# Patient Record
Sex: FEMALE | Race: ASIAN | NOT HISPANIC OR LATINO | URBAN - METROPOLITAN AREA
[De-identification: names, ages, dates, MRNs, and addresses within clinical notes are randomized per-mention and may not be internally consistent; named-entity substitution may affect disease eponyms.]

---

## 2017-01-11 ENCOUNTER — INPATIENT (INPATIENT)
Facility: HOSPITAL | Age: 82
LOS: 2 days | Discharge: HOME | End: 2017-01-14
Attending: INTERNAL MEDICINE | Admitting: INTERNAL MEDICINE
Payer: COMMERCIAL

## 2017-01-11 VITALS
HEART RATE: 95 BPM | OXYGEN SATURATION: 99 % | DIASTOLIC BLOOD PRESSURE: 84 MMHG | TEMPERATURE: 98 F | RESPIRATION RATE: 16 BRPM | SYSTOLIC BLOOD PRESSURE: 159 MMHG

## 2017-01-11 LAB
ALBUMIN SERPL ELPH-MCNC: 4.1 G/DL — SIGNIFICANT CHANGE UP (ref 3.3–5)
ALP SERPL-CCNC: 45 U/L — SIGNIFICANT CHANGE UP (ref 40–120)
ALT FLD-CCNC: 16 U/L — SIGNIFICANT CHANGE UP (ref 4–33)
APPEARANCE UR: CLEAR — SIGNIFICANT CHANGE UP
AST SERPL-CCNC: 24 U/L — SIGNIFICANT CHANGE UP (ref 4–32)
BACTERIA # UR AUTO: SIGNIFICANT CHANGE UP
BASE EXCESS BLDV CALC-SCNC: -1.4 MMOL/L — SIGNIFICANT CHANGE UP
BASE EXCESS BLDV CALC-SCNC: -2.3 MMOL/L — SIGNIFICANT CHANGE UP
BASOPHILS # BLD AUTO: 0.01 K/UL — SIGNIFICANT CHANGE UP (ref 0–0.2)
BASOPHILS NFR BLD AUTO: 0.2 % — SIGNIFICANT CHANGE UP (ref 0–2)
BILIRUB SERPL-MCNC: 0.4 MG/DL — SIGNIFICANT CHANGE UP (ref 0.2–1.2)
BILIRUB UR-MCNC: NEGATIVE — SIGNIFICANT CHANGE UP
BLOOD GAS VENOUS - CREATININE: 0.62 MG/DL — SIGNIFICANT CHANGE UP (ref 0.5–1.3)
BLOOD GAS VENOUS - CREATININE: 0.62 MG/DL — SIGNIFICANT CHANGE UP (ref 0.5–1.3)
BLOOD UR QL VISUAL: NEGATIVE — SIGNIFICANT CHANGE UP
BUN SERPL-MCNC: 11 MG/DL — SIGNIFICANT CHANGE UP (ref 7–23)
CALCIUM SERPL-MCNC: 8.7 MG/DL — SIGNIFICANT CHANGE UP (ref 8.4–10.5)
CHLORIDE BLDV-SCNC: 102 MMOL/L — SIGNIFICANT CHANGE UP (ref 96–108)
CHLORIDE BLDV-SCNC: 103 MMOL/L — SIGNIFICANT CHANGE UP (ref 96–108)
CHLORIDE SERPL-SCNC: 97 MMOL/L — LOW (ref 98–107)
CO2 SERPL-SCNC: 19 MMOL/L — LOW (ref 22–31)
COLOR SPEC: SIGNIFICANT CHANGE UP
CREAT SERPL-MCNC: 0.72 MG/DL — SIGNIFICANT CHANGE UP (ref 0.5–1.3)
EOSINOPHIL # BLD AUTO: 0.07 K/UL — SIGNIFICANT CHANGE UP (ref 0–0.5)
EOSINOPHIL NFR BLD AUTO: 1.1 % — SIGNIFICANT CHANGE UP (ref 0–6)
GAS PNL BLDV: 130 MMOL/L — LOW (ref 136–146)
GAS PNL BLDV: 136 MMOL/L — SIGNIFICANT CHANGE UP (ref 136–146)
GLUCOSE BLDV-MCNC: 168 — HIGH (ref 70–99)
GLUCOSE BLDV-MCNC: 173 — HIGH (ref 70–99)
GLUCOSE SERPL-MCNC: 178 MG/DL — HIGH (ref 70–99)
GLUCOSE UR-MCNC: NEGATIVE — SIGNIFICANT CHANGE UP
HCO3 BLDV-SCNC: 21 MMOL/L — SIGNIFICANT CHANGE UP (ref 20–27)
HCO3 BLDV-SCNC: 22 MMOL/L — SIGNIFICANT CHANGE UP (ref 20–27)
HCT VFR BLD CALC: 35.9 % — SIGNIFICANT CHANGE UP (ref 34.5–45)
HCT VFR BLDV CALC: 37.6 % — SIGNIFICANT CHANGE UP (ref 34.5–45)
HCT VFR BLDV CALC: 43.1 % — SIGNIFICANT CHANGE UP (ref 34.5–45)
HGB BLD-MCNC: 11.9 G/DL — SIGNIFICANT CHANGE UP (ref 11.5–15.5)
HGB BLDV-MCNC: 12.2 G/DL — SIGNIFICANT CHANGE UP (ref 11.5–15.5)
HGB BLDV-MCNC: 14.1 G/DL — SIGNIFICANT CHANGE UP (ref 11.5–15.5)
IMM GRANULOCYTES NFR BLD AUTO: 0.2 % — SIGNIFICANT CHANGE UP (ref 0–1.5)
KETONES UR-MCNC: NEGATIVE — SIGNIFICANT CHANGE UP
LACTATE BLDV-MCNC: 3.4 MMOL/L — HIGH (ref 0.5–2)
LACTATE BLDV-MCNC: 3.9 MMOL/L — HIGH (ref 0.5–2)
LEUKOCYTE ESTERASE UR-ACNC: NEGATIVE — SIGNIFICANT CHANGE UP
LIDOCAIN IGE QN: 37.7 U/L — SIGNIFICANT CHANGE UP (ref 7–60)
LYMPHOCYTES # BLD AUTO: 2.56 K/UL — SIGNIFICANT CHANGE UP (ref 1–3.3)
LYMPHOCYTES # BLD AUTO: 40.8 % — SIGNIFICANT CHANGE UP (ref 13–44)
MCHC RBC-ENTMCNC: 29.9 PG — SIGNIFICANT CHANGE UP (ref 27–34)
MCHC RBC-ENTMCNC: 33.1 % — SIGNIFICANT CHANGE UP (ref 32–36)
MCV RBC AUTO: 90.2 FL — SIGNIFICANT CHANGE UP (ref 80–100)
MONOCYTES # BLD AUTO: 0.43 K/UL — SIGNIFICANT CHANGE UP (ref 0–0.9)
MONOCYTES NFR BLD AUTO: 6.9 % — SIGNIFICANT CHANGE UP (ref 2–14)
MUCOUS THREADS # UR AUTO: SIGNIFICANT CHANGE UP
NEUTROPHILS # BLD AUTO: 3.19 K/UL — SIGNIFICANT CHANGE UP (ref 1.8–7.4)
NEUTROPHILS NFR BLD AUTO: 50.8 % — SIGNIFICANT CHANGE UP (ref 43–77)
NITRITE UR-MCNC: NEGATIVE — SIGNIFICANT CHANGE UP
NON-SQ EPI CELLS # UR AUTO: <1 — SIGNIFICANT CHANGE UP
PCO2 BLDV: 39 MMHG — LOW (ref 41–51)
PCO2 BLDV: 55 MMHG — HIGH (ref 41–51)
PH BLDV: 7.28 PH — LOW (ref 7.32–7.43)
PH BLDV: 7.37 PH — SIGNIFICANT CHANGE UP (ref 7.32–7.43)
PH UR: 6.5 — SIGNIFICANT CHANGE UP (ref 4.6–8)
PLATELET # BLD AUTO: 199 K/UL — SIGNIFICANT CHANGE UP (ref 150–400)
PMV BLD: 10.3 FL — SIGNIFICANT CHANGE UP (ref 7–13)
PO2 BLDV: 24 MMHG — LOW (ref 35–40)
PO2 BLDV: 50 MMHG — HIGH (ref 35–40)
POTASSIUM BLDV-SCNC: 3.9 MMOL/L — SIGNIFICANT CHANGE UP (ref 3.4–4.5)
POTASSIUM BLDV-SCNC: 3.9 MMOL/L — SIGNIFICANT CHANGE UP (ref 3.4–4.5)
POTASSIUM SERPL-MCNC: 4.2 MMOL/L — SIGNIFICANT CHANGE UP (ref 3.5–5.3)
POTASSIUM SERPL-SCNC: 4.2 MMOL/L — SIGNIFICANT CHANGE UP (ref 3.5–5.3)
PROT SERPL-MCNC: 7.5 G/DL — SIGNIFICANT CHANGE UP (ref 6–8.3)
PROT UR-MCNC: NEGATIVE — SIGNIFICANT CHANGE UP
RBC # BLD: 3.98 M/UL — SIGNIFICANT CHANGE UP (ref 3.8–5.2)
RBC # FLD: 12.9 % — SIGNIFICANT CHANGE UP (ref 10.3–14.5)
SAO2 % BLDV: 29.6 % — LOW (ref 60–85)
SAO2 % BLDV: 83.5 % — SIGNIFICANT CHANGE UP (ref 60–85)
SODIUM SERPL-SCNC: 134 MMOL/L — LOW (ref 135–145)
SP GR SPEC: 1.01 — SIGNIFICANT CHANGE UP (ref 1–1.03)
SQUAMOUS # UR AUTO: SIGNIFICANT CHANGE UP
UROBILINOGEN FLD QL: NORMAL E.U. — SIGNIFICANT CHANGE UP (ref 0.1–0.2)
WBC # BLD: 6.27 K/UL — SIGNIFICANT CHANGE UP (ref 3.8–10.5)
WBC # FLD AUTO: 6.27 K/UL — SIGNIFICANT CHANGE UP (ref 3.8–10.5)
WBC UR QL: SIGNIFICANT CHANGE UP (ref 0–?)

## 2017-01-11 PROCEDURE — 74174 CTA ABD&PLVS W/CONTRAST: CPT | Mod: 26

## 2017-01-11 RX ORDER — SODIUM CHLORIDE 9 MG/ML
1000 INJECTION INTRAMUSCULAR; INTRAVENOUS; SUBCUTANEOUS ONCE
Qty: 0 | Refills: 0 | Status: COMPLETED | OUTPATIENT
Start: 2017-01-11 | End: 2017-01-11

## 2017-01-11 RX ORDER — GLYBURIDE 5 MG
10 TABLET ORAL
Qty: 0 | Refills: 0 | Status: DISCONTINUED | OUTPATIENT
Start: 2017-01-11 | End: 2017-01-11

## 2017-01-11 RX ORDER — FOLIC ACID 0.8 MG
1 TABLET ORAL DAILY
Qty: 0 | Refills: 0 | Status: DISCONTINUED | OUTPATIENT
Start: 2017-01-11 | End: 2017-01-14

## 2017-01-11 RX ORDER — METRONIDAZOLE 500 MG
500 TABLET ORAL EVERY 8 HOURS
Qty: 0 | Refills: 0 | Status: DISCONTINUED | OUTPATIENT
Start: 2017-01-11 | End: 2017-01-14

## 2017-01-11 RX ORDER — ACETAMINOPHEN 500 MG
650 TABLET ORAL ONCE
Qty: 0 | Refills: 0 | Status: COMPLETED | OUTPATIENT
Start: 2017-01-11 | End: 2017-01-11

## 2017-01-11 RX ORDER — METFORMIN HYDROCHLORIDE 850 MG/1
1000 TABLET ORAL
Qty: 0 | Refills: 0 | Status: DISCONTINUED | OUTPATIENT
Start: 2017-01-11 | End: 2017-01-12

## 2017-01-11 RX ORDER — INSULIN LISPRO 100/ML
VIAL (ML) SUBCUTANEOUS
Qty: 0 | Refills: 0 | Status: DISCONTINUED | OUTPATIENT
Start: 2017-01-11 | End: 2017-01-14

## 2017-01-11 RX ORDER — DEXTROSE 50 % IN WATER 50 %
25 SYRINGE (ML) INTRAVENOUS ONCE
Qty: 0 | Refills: 0 | Status: DISCONTINUED | OUTPATIENT
Start: 2017-01-11 | End: 2017-01-14

## 2017-01-11 RX ORDER — MORPHINE SULFATE 50 MG/1
4 CAPSULE, EXTENDED RELEASE ORAL ONCE
Qty: 0 | Refills: 0 | Status: DISCONTINUED | OUTPATIENT
Start: 2017-01-11 | End: 2017-01-11

## 2017-01-11 RX ORDER — GLUCAGON INJECTION, SOLUTION 0.5 MG/.1ML
1 INJECTION, SOLUTION SUBCUTANEOUS ONCE
Qty: 0 | Refills: 0 | Status: DISCONTINUED | OUTPATIENT
Start: 2017-01-11 | End: 2017-01-14

## 2017-01-11 RX ORDER — SODIUM CHLORIDE 9 MG/ML
1000 INJECTION, SOLUTION INTRAVENOUS
Qty: 0 | Refills: 0 | Status: DISCONTINUED | OUTPATIENT
Start: 2017-01-11 | End: 2017-01-14

## 2017-01-11 RX ORDER — CIPROFLOXACIN LACTATE 400MG/40ML
400 VIAL (ML) INTRAVENOUS EVERY 12 HOURS
Qty: 0 | Refills: 0 | Status: DISCONTINUED | OUTPATIENT
Start: 2017-01-11 | End: 2017-01-14

## 2017-01-11 RX ORDER — VALSARTAN 80 MG/1
40 TABLET ORAL ONCE
Qty: 0 | Refills: 0 | Status: COMPLETED | OUTPATIENT
Start: 2017-01-11 | End: 2017-01-11

## 2017-01-11 RX ORDER — DEXTROSE 50 % IN WATER 50 %
12.5 SYRINGE (ML) INTRAVENOUS ONCE
Qty: 0 | Refills: 0 | Status: DISCONTINUED | OUTPATIENT
Start: 2017-01-11 | End: 2017-01-14

## 2017-01-11 RX ORDER — DEXTROSE 50 % IN WATER 50 %
1 SYRINGE (ML) INTRAVENOUS ONCE
Qty: 0 | Refills: 0 | Status: DISCONTINUED | OUTPATIENT
Start: 2017-01-11 | End: 2017-01-14

## 2017-01-11 RX ORDER — VALSARTAN 80 MG/1
40 TABLET ORAL DAILY
Qty: 0 | Refills: 0 | Status: DISCONTINUED | OUTPATIENT
Start: 2017-01-11 | End: 2017-01-12

## 2017-01-11 RX ORDER — INSULIN GLARGINE 100 [IU]/ML
10 INJECTION, SOLUTION SUBCUTANEOUS AT BEDTIME
Qty: 0 | Refills: 0 | Status: DISCONTINUED | OUTPATIENT
Start: 2017-01-11 | End: 2017-01-12

## 2017-01-11 RX ORDER — CIPROFLOXACIN LACTATE 400MG/40ML
400 VIAL (ML) INTRAVENOUS ONCE
Qty: 0 | Refills: 0 | Status: COMPLETED | OUTPATIENT
Start: 2017-01-11 | End: 2017-01-11

## 2017-01-11 RX ORDER — METRONIDAZOLE 500 MG
500 TABLET ORAL ONCE
Qty: 0 | Refills: 0 | Status: COMPLETED | OUTPATIENT
Start: 2017-01-11 | End: 2017-01-11

## 2017-01-11 RX ORDER — SODIUM CHLORIDE 9 MG/ML
1000 INJECTION INTRAMUSCULAR; INTRAVENOUS; SUBCUTANEOUS
Qty: 0 | Refills: 0 | Status: DISCONTINUED | OUTPATIENT
Start: 2017-01-11 | End: 2017-01-12

## 2017-01-11 RX ADMIN — METFORMIN HYDROCHLORIDE 1000 MILLIGRAM(S): 850 TABLET ORAL at 18:03

## 2017-01-11 RX ADMIN — Medication 100 MILLIGRAM(S): at 17:16

## 2017-01-11 RX ADMIN — SODIUM CHLORIDE 125 MILLILITER(S): 9 INJECTION INTRAMUSCULAR; INTRAVENOUS; SUBCUTANEOUS at 21:56

## 2017-01-11 RX ADMIN — SODIUM CHLORIDE 125 MILLILITER(S): 9 INJECTION INTRAMUSCULAR; INTRAVENOUS; SUBCUTANEOUS at 14:49

## 2017-01-11 RX ADMIN — MORPHINE SULFATE 4 MILLIGRAM(S): 50 CAPSULE, EXTENDED RELEASE ORAL at 11:02

## 2017-01-11 RX ADMIN — VALSARTAN 40 MILLIGRAM(S): 80 TABLET ORAL at 23:00

## 2017-01-11 RX ADMIN — SODIUM CHLORIDE 1000 MILLILITER(S): 9 INJECTION INTRAMUSCULAR; INTRAVENOUS; SUBCUTANEOUS at 12:56

## 2017-01-11 RX ADMIN — Medication 650 MILLIGRAM(S): at 23:45

## 2017-01-11 RX ADMIN — MORPHINE SULFATE 4 MILLIGRAM(S): 50 CAPSULE, EXTENDED RELEASE ORAL at 11:20

## 2017-01-11 RX ADMIN — SODIUM CHLORIDE 125 MILLILITER(S): 9 INJECTION INTRAMUSCULAR; INTRAVENOUS; SUBCUTANEOUS at 18:52

## 2017-01-11 RX ADMIN — Medication 650 MILLIGRAM(S): at 23:00

## 2017-01-11 RX ADMIN — SODIUM CHLORIDE 1000 MILLILITER(S): 9 INJECTION INTRAMUSCULAR; INTRAVENOUS; SUBCUTANEOUS at 11:02

## 2017-01-11 RX ADMIN — INSULIN GLARGINE 10 UNIT(S): 100 INJECTION, SOLUTION SUBCUTANEOUS at 21:54

## 2017-01-11 RX ADMIN — Medication 200 MILLIGRAM(S): at 13:13

## 2017-01-11 NOTE — ED PROVIDER NOTE - OBJECTIVE STATEMENT
Pt is an 86 y/o F nonsmoker PMHx DM, HTN, HLD, s/p tam, s/p umbilical hernia repair p/w abdominal pain and diarrhea x 7 days.  Pt endorses insidious onset watery nonbloody diarrhea associated with generalized abdominal pain, worse across lower abdomen.  Denies any fevers, chills, nausea, vomiting, chest pain, SOB, back pain, dysuria, cloudy urine, hematuria, flank pain, recent antibiotic use, travelling, or hospitalizations.

## 2017-01-11 NOTE — ED PROVIDER NOTE - ATTENDING CONTRIBUTION TO CARE
Dr. Hatch:  I performed a face to face bedside interview with patient regarding history of present illness, review of symptoms and past medical history. I completed an independent physical exam.  I have discussed patient's plan of care with PA.   I agree with note as stated above, having amended the EMR as needed to reflect my findings.   This includes HISTORY OF PRESENT ILLNESS, HIV, PAST MEDICAL/SURGICAL/FAMILY/SOCIAL HISTORY, ALLERGIES AND HOME MEDICATIONS, REVIEW OF SYSTEMS, PHYSICAL EXAM, and any PROGRESS NOTES during the time I functioned as the attending physician for this patient.    85F h/o HTN, DM, s/p cholecystectomy, presents with generalized abd pain and watery diarrhea x 1 week.  No recent antibiotics, travel, sick contacts.  Denies fever/chills, cp, sob, n/v, urinary symptoms.    Exam:  - well appearing  - dry mucus membrane  - rrr  - ctab  - abd soft, +mild TTP RLQ and LLQ    A/P  - ddx: colitis, diverticulitis, perforated appendicitis; consider mesenteric ischemia and C diff  - cbc, cmp, lipase, vbg  - abd CT angio  - IVF, pain control

## 2017-01-11 NOTE — ED CDU PROVIDER NOTE - ATTENDING CONTRIBUTION TO CARE
I have seen and examined the patient face to face, have reviewed and addended the HPI, PE and a/p as necessary. ( see hpi pe and mdm " att" ) h/o HTN DM HLD lives alone, here with c/o loose watery stools for several days. pt denies recent travel. no recent abx use. no known sick contacts.  pt still feeling weak overnight in CDU, not toleratin PO well, receiving  cipro/ flagyl for colitis. last bm loose watery nonbloody at 4 am.   att exam: patient awake alert NAD . LUNGS CTAB no wheeze no crackle. CARD RRR no m/r/g.  Abdomen soft, nontender.  no rebound no guarding no CVA tenderness. EXT WWP no edema no calf tenderness CV 2+DP/PT bilaterally. nuero A&Ox3   skin warm and dry no rash   plan: due to fatigue, dehuydration, and pt lives alone. will admit for further pt, dehydration, and colitis.   Cirilli

## 2017-01-11 NOTE — ED ADULT NURSE NOTE - OBJECTIVE STATEMENT
Patient received AA&Ox3 c/o abdominal pain and diarrhea x1wk. VSS on RA. Patient denies chest pain, N/V, SOB, fever, chills. Patient states that she has had 5-6 BMs per day for the past week - pt. states that this occurs after she eats. 20g PIV in place to left FA, labs drawn - will continue to monitor.

## 2017-01-11 NOTE — ED CDU PROVIDER NOTE - ENMT NEGATIVE STATEMENT, MLM
Ears: no ear pain and no hearing problems. Nose: no nasal congestion and no nasal drainage. Mouth/Throat: no dysphagia, no hoarseness and no throat pain.Neck: no lumps, no pain, no stiffness and no swollen glands.

## 2017-01-11 NOTE — ED CDU PROVIDER NOTE - OBJECTIVE STATEMENT
Patient is a 85 year old female who states that  she has had diarrhea for the past 7 days. States that she has had abdominal pain also which started out as mild and progressively worsened. She states she has not had any nausea or vomiting. , no bllody stools. No fever. States pain and diarrhea persisted so she came for evaluation.

## 2017-01-11 NOTE — ED CDU PROVIDER NOTE - PROGRESS NOTE DETAILS
CDU ARMANDO Barlow Addendum------  This patient was signed out to me by CDU ARMANDO Velez and CDU attending Dr. Warner on 01/11/17 at 1900 hrs; test results reviewed.  In interim, pt has been resting comfortably.  Pt. reassessed / re-examined by me; pt has benign cardiopulmonary and abdominal exam with no tenderness to palpation of abdomen.  Pt. c/o headache and some left otalgia; ENT exam does not reveal any acute pathology; EAC/TMs appear WNL bilaterally; oropharyngeal exam reveals no erythema or tissue enlargement.  Speech is clear in full, effortless sentences.  Pt agrees to dose of Tylenol for symptoms; CDU plan discussed with pt who verbalizes agreement with plan.  Pt. shown call bell; pt given opportunity to ask questions.  Pt. expresses desire to go home as soon as possible; I inquired about pt's care circumstances at home.  Pt. states she has an aide that comes for 4 hours per day; states she feels she needs help for longer time frame each day.  I explained to pt that we will call the  /  to address this issue with pt in the morning time; pt verbalizes appreciation to have this issue addressed.  Pt stable at present; will continue to monitor / reassess. CDU ARMANDO Barlow Addendum-----  Pt c/o generalized malaise just a few minutes ago; stated she feels weak.  On further questioning, pt states this is the same way she felt when she presented to the ED.  Pt. denies SOB / pain / chest discomfort / abdominal pain / back pain or other pain.  Vitals rechecked; fingerstick glucose is 134, /103, HR 90's, RR 18, O2 sat 100% on room air.  EKG will be performed; pt states her doctor recently told her to increase valsartan to 80 mg daily (pt did not mention this to me last night, and I reviewed all of pt's home meds with her twice to be sure all info was accurate).  Labetalol 10 mg IV will be given now and pt reassessed.  Pt. states she is hungry; crackers, applesauce and tea provided to pt. CDU PA Cain Addendum-----  BP improving (most recent was 188/74, with HR 76); pt states she is feeling improved.  Valsartan 40 mg additional dose ordered.  Small areas of isolated macular erythema (nontender, +blanching, subjectively nonpruritic per pt) noted to chin, left lateral eyebrow area, under b/l breasts (very small area, ~4 cm x 2 cm each) noted; pt denies pruritus to any of these; pt states only area that is slightly pruritic is to back of neck.  No rash elsewhere; Benadryl 25 mg will be given; will continue to monitor.  EKG performed shows sinus rhythm @ 74 bpm; lead I and AVL show T wave inversions; last EKG on file is from 8/28/2012 (lead I and AVL show upright T waves on that EKG); age-indeterminate change.  Pt. continues to deny any cardiopulmonary c/o; denies back pain or active abdominal pain / nausea.  Pt. states she is feeling improved; will continue to monitor / reassess.  Pt. will be signed out to CDU day PA and attending at 0700 hrs. ARMANDO Gaviria: pt reports feeling fatigued with continued diarrhea last BM at 4am.  Attempted to contact PMD Dr Felicita Mohamud [phone] with no call back.  Pt admitted to hospitalist Dr Rosa Woodruff advised to order stool cultures MAR notified. ARMANDO Gaviria: received call back from PMD Dr Oneida avalos with plan for admission.  Discontinued pt's metformin as patient recently had a CT scan. ATT ADMIT NOTE: pt with continued dehydration , fatigue, and very little po intake . raudel kenny/ dr. cevallos ok admission to doc of the day. cirilli

## 2017-01-11 NOTE — ED CDU PROVIDER NOTE - ST/T WAVE
T wave inversions in lead I and AVL; last EKG on file is from 8/28/2012 (Twave in I and AVL upright at that time).

## 2017-01-12 DIAGNOSIS — K52.9 NONINFECTIVE GASTROENTERITIS AND COLITIS, UNSPECIFIED: ICD-10-CM

## 2017-01-12 DIAGNOSIS — I10 ESSENTIAL (PRIMARY) HYPERTENSION: ICD-10-CM

## 2017-01-12 DIAGNOSIS — Z41.8 ENCOUNTER FOR OTHER PROCEDURES FOR PURPOSES OTHER THAN REMEDYING HEALTH STATE: ICD-10-CM

## 2017-01-12 DIAGNOSIS — E11.9 TYPE 2 DIABETES MELLITUS WITHOUT COMPLICATIONS: ICD-10-CM

## 2017-01-12 DIAGNOSIS — E87.2 ACIDOSIS: ICD-10-CM

## 2017-01-12 DIAGNOSIS — E78.00 PURE HYPERCHOLESTEROLEMIA, UNSPECIFIED: ICD-10-CM

## 2017-01-12 LAB
ALBUMIN SERPL ELPH-MCNC: 4 G/DL — SIGNIFICANT CHANGE UP (ref 3.3–5)
ALP SERPL-CCNC: 44 U/L — SIGNIFICANT CHANGE UP (ref 40–120)
ALT FLD-CCNC: 16 U/L — SIGNIFICANT CHANGE UP (ref 4–33)
AST SERPL-CCNC: 26 U/L — SIGNIFICANT CHANGE UP (ref 4–32)
BASE EXCESS BLDV CALC-SCNC: 0.1 MMOL/L — SIGNIFICANT CHANGE UP
BASOPHILS # BLD AUTO: 0.01 K/UL — SIGNIFICANT CHANGE UP (ref 0–0.2)
BASOPHILS NFR BLD AUTO: 0.2 % — SIGNIFICANT CHANGE UP (ref 0–2)
BILIRUB SERPL-MCNC: 0.4 MG/DL — SIGNIFICANT CHANGE UP (ref 0.2–1.2)
BLOOD GAS VENOUS - CREATININE: 0.6 MG/DL — SIGNIFICANT CHANGE UP (ref 0.5–1.3)
BUN SERPL-MCNC: 6 MG/DL — LOW (ref 7–23)
C DIFF TOX GENS STL QL NAA+PROBE: SIGNIFICANT CHANGE UP
CALCIUM SERPL-MCNC: 8.6 MG/DL — SIGNIFICANT CHANGE UP (ref 8.4–10.5)
CHLORIDE BLDV-SCNC: 106 MMOL/L — SIGNIFICANT CHANGE UP (ref 96–108)
CHLORIDE SERPL-SCNC: 101 MMOL/L — SIGNIFICANT CHANGE UP (ref 98–107)
CO2 SERPL-SCNC: 21 MMOL/L — LOW (ref 22–31)
CREAT SERPL-MCNC: 0.76 MG/DL — SIGNIFICANT CHANGE UP (ref 0.5–1.3)
CRP SERPL-MCNC: 5.1 MG/L — HIGH (ref 0.3–5)
EOSINOPHIL # BLD AUTO: 0.06 K/UL — SIGNIFICANT CHANGE UP (ref 0–0.5)
EOSINOPHIL NFR BLD AUTO: 0.9 % — SIGNIFICANT CHANGE UP (ref 0–6)
ERYTHROCYTE [SEDIMENTATION RATE] IN BLOOD: 28 MM/HR — HIGH (ref 4–25)
GAS PNL BLDV: 136 MMOL/L — SIGNIFICANT CHANGE UP (ref 136–146)
GLUCOSE BLDV-MCNC: 182 — HIGH (ref 70–99)
GLUCOSE SERPL-MCNC: 184 MG/DL — HIGH (ref 70–99)
HCO3 BLDV-SCNC: 23 MMOL/L — SIGNIFICANT CHANGE UP (ref 20–27)
HCT VFR BLD CALC: 36.3 % — SIGNIFICANT CHANGE UP (ref 34.5–45)
HCT VFR BLDV CALC: 47.1 % — HIGH (ref 34.5–45)
HGB BLD-MCNC: 12.3 G/DL — SIGNIFICANT CHANGE UP (ref 11.5–15.5)
HGB BLDV-MCNC: 15.4 G/DL — SIGNIFICANT CHANGE UP (ref 11.5–15.5)
IMM GRANULOCYTES NFR BLD AUTO: 0.3 % — SIGNIFICANT CHANGE UP (ref 0–1.5)
LACTATE BLDV-MCNC: 3.7 MMOL/L — HIGH (ref 0.5–2)
LYMPHOCYTES # BLD AUTO: 2.16 K/UL — SIGNIFICANT CHANGE UP (ref 1–3.3)
LYMPHOCYTES # BLD AUTO: 34.1 % — SIGNIFICANT CHANGE UP (ref 13–44)
MCHC RBC-ENTMCNC: 30.4 PG — SIGNIFICANT CHANGE UP (ref 27–34)
MCHC RBC-ENTMCNC: 33.9 % — SIGNIFICANT CHANGE UP (ref 32–36)
MCV RBC AUTO: 89.9 FL — SIGNIFICANT CHANGE UP (ref 80–100)
MONOCYTES # BLD AUTO: 0.55 K/UL — SIGNIFICANT CHANGE UP (ref 0–0.9)
MONOCYTES NFR BLD AUTO: 8.7 % — SIGNIFICANT CHANGE UP (ref 2–14)
NEUTROPHILS # BLD AUTO: 3.53 K/UL — SIGNIFICANT CHANGE UP (ref 1.8–7.4)
NEUTROPHILS NFR BLD AUTO: 55.8 % — SIGNIFICANT CHANGE UP (ref 43–77)
PCO2 BLDV: 43 MMHG — SIGNIFICANT CHANGE UP (ref 41–51)
PH BLDV: 7.38 PH — SIGNIFICANT CHANGE UP (ref 7.32–7.43)
PLATELET # BLD AUTO: 205 K/UL — SIGNIFICANT CHANGE UP (ref 150–400)
PMV BLD: 9.9 FL — SIGNIFICANT CHANGE UP (ref 7–13)
PO2 BLDV: < 24 MMHG — LOW (ref 35–40)
POTASSIUM BLDV-SCNC: 4.2 MMOL/L — SIGNIFICANT CHANGE UP (ref 3.4–4.5)
POTASSIUM SERPL-MCNC: 4.5 MMOL/L — SIGNIFICANT CHANGE UP (ref 3.5–5.3)
POTASSIUM SERPL-SCNC: 4.5 MMOL/L — SIGNIFICANT CHANGE UP (ref 3.5–5.3)
PROT SERPL-MCNC: 7.4 G/DL — SIGNIFICANT CHANGE UP (ref 6–8.3)
RBC # BLD: 4.04 M/UL — SIGNIFICANT CHANGE UP (ref 3.8–5.2)
RBC # FLD: 12.9 % — SIGNIFICANT CHANGE UP (ref 10.3–14.5)
SAO2 % BLDV: 32.1 % — LOW (ref 60–85)
SODIUM SERPL-SCNC: 139 MMOL/L — SIGNIFICANT CHANGE UP (ref 135–145)
WBC # BLD: 6.33 K/UL — SIGNIFICANT CHANGE UP (ref 3.8–10.5)
WBC # FLD AUTO: 6.33 K/UL — SIGNIFICANT CHANGE UP (ref 3.8–10.5)

## 2017-01-12 PROCEDURE — 99223 1ST HOSP IP/OBS HIGH 75: CPT

## 2017-01-12 RX ORDER — GLYBURIDE 5 MG
10 TABLET ORAL
Qty: 0 | Refills: 0 | COMMUNITY

## 2017-01-12 RX ORDER — SODIUM CHLORIDE 9 MG/ML
1000 INJECTION, SOLUTION INTRAVENOUS
Qty: 0 | Refills: 0 | Status: DISCONTINUED | OUTPATIENT
Start: 2017-01-12 | End: 2017-01-13

## 2017-01-12 RX ORDER — HEPARIN SODIUM 5000 [USP'U]/ML
5000 INJECTION INTRAVENOUS; SUBCUTANEOUS EVERY 8 HOURS
Qty: 0 | Refills: 0 | Status: DISCONTINUED | OUTPATIENT
Start: 2017-01-12 | End: 2017-01-14

## 2017-01-12 RX ORDER — ATORVASTATIN CALCIUM 80 MG/1
10 TABLET, FILM COATED ORAL AT BEDTIME
Qty: 0 | Refills: 0 | Status: DISCONTINUED | OUTPATIENT
Start: 2017-01-12 | End: 2017-01-14

## 2017-01-12 RX ORDER — INSULIN LISPRO 100/ML
10 VIAL (ML) SUBCUTANEOUS
Qty: 0 | Refills: 0 | COMMUNITY

## 2017-01-12 RX ORDER — VALSARTAN 80 MG/1
80 TABLET ORAL DAILY
Qty: 0 | Refills: 0 | Status: DISCONTINUED | OUTPATIENT
Start: 2017-01-13 | End: 2017-01-14

## 2017-01-12 RX ORDER — VALSARTAN 80 MG/1
80 TABLET ORAL DAILY
Qty: 0 | Refills: 0 | Status: DISCONTINUED | OUTPATIENT
Start: 2017-01-12 | End: 2017-01-12

## 2017-01-12 RX ORDER — VALSARTAN 80 MG/1
40 TABLET ORAL ONCE
Qty: 0 | Refills: 0 | Status: COMPLETED | OUTPATIENT
Start: 2017-01-12 | End: 2017-01-12

## 2017-01-12 RX ORDER — SODIUM CHLORIDE 9 MG/ML
1000 INJECTION INTRAMUSCULAR; INTRAVENOUS; SUBCUTANEOUS
Qty: 0 | Refills: 0 | Status: DISCONTINUED | OUTPATIENT
Start: 2017-01-12 | End: 2017-01-12

## 2017-01-12 RX ORDER — INSULIN GLARGINE 100 [IU]/ML
10 INJECTION, SOLUTION SUBCUTANEOUS AT BEDTIME
Qty: 0 | Refills: 0 | Status: DISCONTINUED | OUTPATIENT
Start: 2017-01-12 | End: 2017-01-14

## 2017-01-12 RX ORDER — DIPHENHYDRAMINE HCL 50 MG
25 CAPSULE ORAL ONCE
Qty: 0 | Refills: 0 | Status: COMPLETED | OUTPATIENT
Start: 2017-01-12 | End: 2017-01-12

## 2017-01-12 RX ORDER — INSULIN GLARGINE 100 [IU]/ML
10 INJECTION, SOLUTION SUBCUTANEOUS
Qty: 0 | Refills: 0 | COMMUNITY

## 2017-01-12 RX ORDER — INSULIN LISPRO 100/ML
VIAL (ML) SUBCUTANEOUS AT BEDTIME
Qty: 0 | Refills: 0 | Status: DISCONTINUED | OUTPATIENT
Start: 2017-01-12 | End: 2017-01-14

## 2017-01-12 RX ORDER — VALSARTAN 80 MG/1
0 TABLET ORAL
Qty: 0 | Refills: 0 | COMMUNITY

## 2017-01-12 RX ORDER — LABETALOL HCL 100 MG
10 TABLET ORAL ONCE
Qty: 0 | Refills: 0 | Status: COMPLETED | OUTPATIENT
Start: 2017-01-12 | End: 2017-01-12

## 2017-01-12 RX ADMIN — Medication 10 MILLIGRAM(S): at 05:50

## 2017-01-12 RX ADMIN — Medication 100 MILLIGRAM(S): at 23:11

## 2017-01-12 RX ADMIN — Medication 25 MILLIGRAM(S): at 07:11

## 2017-01-12 RX ADMIN — HEPARIN SODIUM 5000 UNIT(S): 5000 INJECTION INTRAVENOUS; SUBCUTANEOUS at 23:10

## 2017-01-12 RX ADMIN — VALSARTAN 40 MILLIGRAM(S): 80 TABLET ORAL at 12:43

## 2017-01-12 RX ADMIN — Medication 100 MILLIGRAM(S): at 23:13

## 2017-01-12 RX ADMIN — Medication 200 MILLIGRAM(S): at 12:43

## 2017-01-12 RX ADMIN — ATORVASTATIN CALCIUM 10 MILLIGRAM(S): 80 TABLET, FILM COATED ORAL at 23:10

## 2017-01-12 RX ADMIN — SODIUM CHLORIDE 90 MILLILITER(S): 9 INJECTION INTRAMUSCULAR; INTRAVENOUS; SUBCUTANEOUS at 07:15

## 2017-01-12 RX ADMIN — METFORMIN HYDROCHLORIDE 1000 MILLIGRAM(S): 850 TABLET ORAL at 09:27

## 2017-01-12 RX ADMIN — SODIUM CHLORIDE 100 MILLILITER(S): 9 INJECTION, SOLUTION INTRAVENOUS at 12:43

## 2017-01-12 RX ADMIN — Medication 1: at 12:44

## 2017-01-12 RX ADMIN — VALSARTAN 40 MILLIGRAM(S): 80 TABLET ORAL at 07:12

## 2017-01-12 RX ADMIN — HEPARIN SODIUM 5000 UNIT(S): 5000 INJECTION INTRAVENOUS; SUBCUTANEOUS at 14:07

## 2017-01-12 RX ADMIN — Medication: at 17:49

## 2017-01-12 RX ADMIN — Medication 200 MILLIGRAM(S): at 00:24

## 2017-01-12 RX ADMIN — Medication 100 MILLIGRAM(S): at 09:49

## 2017-01-12 RX ADMIN — Medication 100 MILLIGRAM(S): at 01:30

## 2017-01-12 RX ADMIN — INSULIN GLARGINE 10 UNIT(S): 100 INJECTION, SOLUTION SUBCUTANEOUS at 23:02

## 2017-01-12 RX ADMIN — Medication 1 MILLIGRAM(S): at 12:48

## 2017-01-12 RX ADMIN — Medication 1: at 09:27

## 2017-01-12 NOTE — H&P ADULT. - HISTORY OF PRESENT ILLNESS
Ms. Hamilton is a pleasant 86 yo F with DM2, HTN, HLD presenting to the [facility] ED for one week of abdominal pain with watery diarrhea. Per the patient, she has been having 5-6 BM's per day, no blood in stool, no associated nausea or vomiting. She also endorses decreased po but reports that is related to the abdominal pain she has with diarrhea. Pt denies abdominal pain without BM. Denies any recent abx use, family members with similar symptoms, changes in diet, or eating out at any restaurants. She denies any fevers or chills. Denies NSAID use (though appears to have taken naproxen in the past) per pharmacy review. No recent travel either. She saw her PMD last week who stated she has viral gastroenteritis and recommended symptomatic care.  However, he abdominal pain became so bad she decided to come to the ED. She was subsequently admitted to the CDU for observation but ultimately continued to feel very weak and have multiple episodes of diarrhea. She was thus admitted to the medicine service.    In the ED: 97.5, 95, 159/84, 16, 99%RA  Received: Ciprofloxacin 400mg IVx1, Flagyl 500mg IVx1, 2L NS,   CDU: Ciprofloxacin continued, home medications continued

## 2017-01-12 NOTE — H&P ADULT. - PROBLEM SELECTOR PLAN 4
-Pt with BP above goal, recently BP med valsartan increased to 80mg daily  -c/w valsartan 80mg daily

## 2017-01-12 NOTE — H&P ADULT. - LAB RESULTS AND INTERPRETATION
Personally reviewed, labs notable for worsening lactic acidosis despite IVF, no leukocytosis or left shift seen on labs, BMP revealing metabolic acidosis, likely bicarb loss in the setting of diarrhea, A1c of 7.1 reveals fairly well-controlled DM2, UA non-infectious appearing

## 2017-01-12 NOTE — H&P ADULT. - PROBLEM SELECTOR PLAN 2
-c/w HISS  -Pt not on Lantus 10U at home per pharmacy but well-controlled with this here, will c/w this for now  -diabetic diet

## 2017-01-12 NOTE — H&P ADULT. - PROBLEM SELECTOR PLAN 1
-Will c/w ciprofloxacin and flagyl for right now  -f/u stool cultures, ova and parasites, C. difficile PCR  -c/w IV, LR@100cc/hr given worsening lactic acidosis and metabolic acidosis  -Advance diet as tolerated, c/w clears for now  -Send blood cultures  -Check ESR/CRP

## 2017-01-12 NOTE — H&P ADULT. - ASSESSMENT
86 yo F with HTN, HLD, DM2 presenting with watery diarrhea 2/2 sigmoid colitis. Given age and negative CT-A, suspect colitis is infectious in nature.

## 2017-01-13 LAB
BASOPHILS # BLD AUTO: 0.01 K/UL — SIGNIFICANT CHANGE UP (ref 0–0.2)
BASOPHILS NFR BLD AUTO: 0.2 % — SIGNIFICANT CHANGE UP (ref 0–2)
BUN SERPL-MCNC: 4 MG/DL — LOW (ref 7–23)
CALCIUM SERPL-MCNC: 8.8 MG/DL — SIGNIFICANT CHANGE UP (ref 8.4–10.5)
CHLORIDE SERPL-SCNC: 99 MMOL/L — SIGNIFICANT CHANGE UP (ref 98–107)
CO2 SERPL-SCNC: 21 MMOL/L — LOW (ref 22–31)
CREAT SERPL-MCNC: 0.77 MG/DL — SIGNIFICANT CHANGE UP (ref 0.5–1.3)
EOSINOPHIL # BLD AUTO: 0.1 K/UL — SIGNIFICANT CHANGE UP (ref 0–0.5)
EOSINOPHIL NFR BLD AUTO: 1.6 % — SIGNIFICANT CHANGE UP (ref 0–6)
GLUCOSE SERPL-MCNC: 159 MG/DL — HIGH (ref 70–99)
HCT VFR BLD CALC: 37 % — SIGNIFICANT CHANGE UP (ref 34.5–45)
HGB BLD-MCNC: 12.5 G/DL — SIGNIFICANT CHANGE UP (ref 11.5–15.5)
IMM GRANULOCYTES NFR BLD AUTO: 0.2 % — SIGNIFICANT CHANGE UP (ref 0–1.5)
LACTATE SERPL-SCNC: 3.7 MMOL/L — HIGH (ref 0.5–2)
LACTATE SERPL-SCNC: 4.2 MMOL/L — CRITICAL HIGH (ref 0.5–2)
LYMPHOCYTES # BLD AUTO: 2.72 K/UL — SIGNIFICANT CHANGE UP (ref 1–3.3)
LYMPHOCYTES # BLD AUTO: 43.5 % — SIGNIFICANT CHANGE UP (ref 13–44)
MAGNESIUM SERPL-MCNC: 1.2 MG/DL — LOW (ref 1.6–2.6)
MCHC RBC-ENTMCNC: 30.9 PG — SIGNIFICANT CHANGE UP (ref 27–34)
MCHC RBC-ENTMCNC: 33.8 % — SIGNIFICANT CHANGE UP (ref 32–36)
MCV RBC AUTO: 91.4 FL — SIGNIFICANT CHANGE UP (ref 80–100)
MONOCYTES # BLD AUTO: 0.43 K/UL — SIGNIFICANT CHANGE UP (ref 0–0.9)
MONOCYTES NFR BLD AUTO: 6.9 % — SIGNIFICANT CHANGE UP (ref 2–14)
NEUTROPHILS # BLD AUTO: 2.98 K/UL — SIGNIFICANT CHANGE UP (ref 1.8–7.4)
NEUTROPHILS NFR BLD AUTO: 47.6 % — SIGNIFICANT CHANGE UP (ref 43–77)
PHOSPHATE SERPL-MCNC: 2.8 MG/DL — SIGNIFICANT CHANGE UP (ref 2.5–4.5)
PLATELET # BLD AUTO: 212 K/UL — SIGNIFICANT CHANGE UP (ref 150–400)
PMV BLD: 11.1 FL — SIGNIFICANT CHANGE UP (ref 7–13)
POTASSIUM SERPL-MCNC: 4.1 MMOL/L — SIGNIFICANT CHANGE UP (ref 3.5–5.3)
POTASSIUM SERPL-SCNC: 4.1 MMOL/L — SIGNIFICANT CHANGE UP (ref 3.5–5.3)
RBC # BLD: 4.05 M/UL — SIGNIFICANT CHANGE UP (ref 3.8–5.2)
RBC # FLD: 13.4 % — SIGNIFICANT CHANGE UP (ref 10.3–14.5)
SODIUM SERPL-SCNC: 141 MMOL/L — SIGNIFICANT CHANGE UP (ref 135–145)
SPECIMEN SOURCE: SIGNIFICANT CHANGE UP
WBC # BLD: 6.25 K/UL — SIGNIFICANT CHANGE UP (ref 3.8–10.5)
WBC # FLD AUTO: 6.25 K/UL — SIGNIFICANT CHANGE UP (ref 3.8–10.5)

## 2017-01-13 PROCEDURE — 99233 SBSQ HOSP IP/OBS HIGH 50: CPT

## 2017-01-13 RX ORDER — SODIUM CHLORIDE 9 MG/ML
1000 INJECTION INTRAMUSCULAR; INTRAVENOUS; SUBCUTANEOUS
Qty: 0 | Refills: 0 | Status: DISCONTINUED | OUTPATIENT
Start: 2017-01-13 | End: 2017-01-14

## 2017-01-13 RX ORDER — MAGNESIUM OXIDE 400 MG ORAL TABLET 241.3 MG
400 TABLET ORAL
Qty: 0 | Refills: 0 | Status: DISCONTINUED | OUTPATIENT
Start: 2017-01-13 | End: 2017-01-14

## 2017-01-13 RX ORDER — MAGNESIUM SULFATE 500 MG/ML
1 VIAL (ML) INJECTION ONCE
Qty: 0 | Refills: 0 | Status: COMPLETED | OUTPATIENT
Start: 2017-01-13 | End: 2017-01-13

## 2017-01-13 RX ADMIN — Medication 100 MILLIGRAM(S): at 21:37

## 2017-01-13 RX ADMIN — VALSARTAN 80 MILLIGRAM(S): 80 TABLET ORAL at 06:50

## 2017-01-13 RX ADMIN — Medication 200 MILLIGRAM(S): at 13:06

## 2017-01-13 RX ADMIN — SODIUM CHLORIDE 100 MILLILITER(S): 9 INJECTION INTRAMUSCULAR; INTRAVENOUS; SUBCUTANEOUS at 21:37

## 2017-01-13 RX ADMIN — Medication 100 MILLIGRAM(S): at 14:23

## 2017-01-13 RX ADMIN — Medication 1 MILLIGRAM(S): at 13:07

## 2017-01-13 RX ADMIN — INSULIN GLARGINE 10 UNIT(S): 100 INJECTION, SOLUTION SUBCUTANEOUS at 21:37

## 2017-01-13 RX ADMIN — Medication 100 MILLIGRAM(S): at 06:51

## 2017-01-13 RX ADMIN — Medication: at 11:43

## 2017-01-13 RX ADMIN — SODIUM CHLORIDE 100 MILLILITER(S): 9 INJECTION INTRAMUSCULAR; INTRAVENOUS; SUBCUTANEOUS at 16:57

## 2017-01-13 RX ADMIN — HEPARIN SODIUM 5000 UNIT(S): 5000 INJECTION INTRAVENOUS; SUBCUTANEOUS at 13:06

## 2017-01-13 RX ADMIN — Medication 200 MILLIGRAM(S): at 01:02

## 2017-01-13 RX ADMIN — HEPARIN SODIUM 5000 UNIT(S): 5000 INJECTION INTRAVENOUS; SUBCUTANEOUS at 06:50

## 2017-01-13 RX ADMIN — SODIUM CHLORIDE 100 MILLILITER(S): 9 INJECTION, SOLUTION INTRAVENOUS at 01:03

## 2017-01-13 RX ADMIN — Medication: at 08:00

## 2017-01-13 RX ADMIN — Medication 100 GRAM(S): at 12:10

## 2017-01-13 RX ADMIN — MAGNESIUM OXIDE 400 MG ORAL TABLET 400 MILLIGRAM(S): 241.3 TABLET ORAL at 17:41

## 2017-01-13 RX ADMIN — SODIUM CHLORIDE 100 MILLILITER(S): 9 INJECTION, SOLUTION INTRAVENOUS at 06:51

## 2017-01-13 RX ADMIN — MAGNESIUM OXIDE 400 MG ORAL TABLET 400 MILLIGRAM(S): 241.3 TABLET ORAL at 13:07

## 2017-01-13 RX ADMIN — Medication: at 16:57

## 2017-01-13 NOTE — PHYSICAL THERAPY INITIAL EVALUATION ADULT - PERTINENT HX OF CURRENT PROBLEM, REHAB EVAL
Ms. Hamilton is a pleasant 84 yo F with DM2, HTN, HLD presenting to the [facility] ED for one week of abdominal pain with watery diarrhea. Per the patient, she has been having 5-6 BM's per day, no blood in stool, no associated nausea or vomiting. She also endorses decreased po but reports that is related to the abdominal pain she has with diarrhea.

## 2017-01-14 VITALS
DIASTOLIC BLOOD PRESSURE: 85 MMHG | OXYGEN SATURATION: 100 % | TEMPERATURE: 98 F | SYSTOLIC BLOOD PRESSURE: 158 MMHG | HEART RATE: 75 BPM | RESPIRATION RATE: 18 BRPM

## 2017-01-14 LAB
BACTERIA STL CULT: SIGNIFICANT CHANGE UP
BACTERIA UR CULT: SIGNIFICANT CHANGE UP
BUN SERPL-MCNC: 5 MG/DL — LOW (ref 7–23)
CALCIUM SERPL-MCNC: 8.5 MG/DL — SIGNIFICANT CHANGE UP (ref 8.4–10.5)
CHLORIDE SERPL-SCNC: 100 MMOL/L — SIGNIFICANT CHANGE UP (ref 98–107)
CO2 SERPL-SCNC: 23 MMOL/L — SIGNIFICANT CHANGE UP (ref 22–31)
CREAT SERPL-MCNC: 0.72 MG/DL — SIGNIFICANT CHANGE UP (ref 0.5–1.3)
GLUCOSE SERPL-MCNC: 151 MG/DL — HIGH (ref 70–99)
LACTATE SERPL-SCNC: 2.7 MMOL/L — HIGH (ref 0.5–2)
POTASSIUM SERPL-MCNC: 4.2 MMOL/L — SIGNIFICANT CHANGE UP (ref 3.5–5.3)
POTASSIUM SERPL-SCNC: 4.2 MMOL/L — SIGNIFICANT CHANGE UP (ref 3.5–5.3)
SODIUM SERPL-SCNC: 140 MMOL/L — SIGNIFICANT CHANGE UP (ref 135–145)

## 2017-01-14 RX ORDER — METRONIDAZOLE 500 MG
1 TABLET ORAL
Qty: 27 | Refills: 0 | OUTPATIENT
Start: 2017-01-14 | End: 2017-01-23

## 2017-01-14 RX ORDER — MOXIFLOXACIN HYDROCHLORIDE TABLETS, 400 MG 400 MG/1
1 TABLET, FILM COATED ORAL
Qty: 18 | Refills: 0 | OUTPATIENT
Start: 2017-01-14 | End: 2017-01-23

## 2017-01-14 RX ADMIN — HEPARIN SODIUM 5000 UNIT(S): 5000 INJECTION INTRAVENOUS; SUBCUTANEOUS at 05:22

## 2017-01-14 RX ADMIN — Medication 100 MILLIGRAM(S): at 05:17

## 2017-01-14 RX ADMIN — MAGNESIUM OXIDE 400 MG ORAL TABLET 400 MILLIGRAM(S): 241.3 TABLET ORAL at 11:47

## 2017-01-14 RX ADMIN — VALSARTAN 80 MILLIGRAM(S): 80 TABLET ORAL at 05:19

## 2017-01-14 RX ADMIN — SODIUM CHLORIDE 100 MILLILITER(S): 9 INJECTION INTRAMUSCULAR; INTRAVENOUS; SUBCUTANEOUS at 05:17

## 2017-01-14 RX ADMIN — Medication 200 MILLIGRAM(S): at 03:47

## 2017-01-14 RX ADMIN — SODIUM CHLORIDE 100 MILLILITER(S): 9 INJECTION INTRAMUSCULAR; INTRAVENOUS; SUBCUTANEOUS at 11:47

## 2017-01-14 RX ADMIN — MAGNESIUM OXIDE 400 MG ORAL TABLET 400 MILLIGRAM(S): 241.3 TABLET ORAL at 08:30

## 2017-01-14 RX ADMIN — Medication 1 MILLIGRAM(S): at 11:47

## 2017-01-14 RX ADMIN — Medication: at 11:46

## 2017-01-14 NOTE — DISCHARGE NOTE ADULT - MEDICATION SUMMARY - MEDICATIONS TO TAKE
I will START or STAY ON the medications listed below when I get home from the hospital:    Flagyl 500 mg oral tablet  -- 1 tab(s) by mouth every 8 hours  -- Do not drink alcoholic beverages when taking this medication.  Finish all this medication unless otherwise directed by prescriber.  May discolor urine or feces.    -- Indication: For Colitis    valsartan 80 mg oral tablet  -- 1 tab(s) by mouth once a day  -- Indication: For Hypertension    gabapentin 100 mg oral capsule  -- 1 cap(s) by mouth 3 times a day  -- Indication: For Need for prophylactic measure    glipiZIDE 10 mg oral tablet  -- 1 tab(s) by mouth 2 times a day  -- Indication: For Diabetes mellitus    metFORMIN 1000 mg oral tablet  -- 1 tab(s) by mouth 2 times a day  -- Indication: For Diabetes mellitus    HumaLOG KwikPen 100 units/mL subcutaneous solution  -- 10 unit(s) subcutaneous 2 times a day  -- Indication: For Diabetes mellitus    pravastatin 40 mg oral tablet  -- 1 tab(s) by mouth once a day  -- Indication: For Hypercholesterolemia    Fish Oil oral capsule  --  by mouth once a day  -- Indication: For Need for prophylactic measure    Cipro 500 mg oral tablet  -- 1 tab(s) by mouth every 12 hours  -- Avoid prolonged or excessive exposure to direct and/or artificial sunlight while taking this medication.  Check with your doctor before becoming pregnant.  Do not take dairy products, antacids, or iron preparations within one hour of this medication.  Finish all this medication unless otherwise directed by prescriber.  Medication should be taken with plenty of water.    -- Indication: For Colitis    multivitamin  --  by mouth once a day  -- Indication: For supplement    Vitamin C  --  by mouth once a day  -- Indication: For supplement    Vitamin D2 50,000 intl units (1.25 mg) oral capsule  -- 1 cap(s) by mouth once a week  -- Indication: For supplement    folic acid 1 mg oral tablet  -- 1 tab(s) by mouth once a day  -- Indication: For supplement

## 2017-01-14 NOTE — DISCHARGE NOTE ADULT - PLAN OF CARE
complete the course of antibiotics Follow up with PCP in a week if symptoms does not resolve- Or Can follow up with GI as out patient call  to make an appointment.

## 2017-01-14 NOTE — DISCHARGE NOTE ADULT - PATIENT PORTAL LINK FT
“You can access the [employer] Patient Portal, offered by [facility], by registering with the following website: [email or web address]”

## 2017-01-14 NOTE — DISCHARGE NOTE ADULT - HOSPITAL COURSE
86 yo F with DM2, HTN, HLD presenting with one week of diarrhea x 7 days found to have sigmoid colitis on CT A/P.    Colitis   c/w ciprofloxacin and flagyl x 10 days     Lacatate was 3.6- Improved with IVF   Pt clinically better, NO WBC, No fever  Able to tolerate diet well   No episodes of diarrhea   Clinically improving   Will need to follow up with PCP or GI at the clinic  -  dispo Home with Home PT

## 2017-01-14 NOTE — DISCHARGE NOTE ADULT - CARE PLAN
Principal Discharge DX:	Colitis  Goal:	complete the course of antibiotics  Instructions for follow-up, activity and diet:	Follow up with PCP in a week if symptoms does not resolve- Or Can follow up with GI as out patient call  to make an appointment.  Secondary Diagnosis:	Hypercholesterolemia

## 2017-01-17 LAB
BACTERIA BLD CULT: SIGNIFICANT CHANGE UP
BACTERIA BLD CULT: SIGNIFICANT CHANGE UP

## 2017-01-19 LAB
O+P SPEC CONC: SIGNIFICANT CHANGE UP
SPECIMEN SOURCE: SIGNIFICANT CHANGE UP
TRI STN SPEC: SIGNIFICANT CHANGE UP

## 2017-05-29 ENCOUNTER — EMERGENCY (EMERGENCY)
Facility: HOSPITAL | Age: 82
LOS: 1 days | Discharge: HOME | End: 2017-05-29
Attending: EMERGENCY MEDICINE | Admitting: EMERGENCY MEDICINE
Payer: MEDICARE

## 2017-05-29 VITALS
HEART RATE: 68 BPM | DIASTOLIC BLOOD PRESSURE: 70 MMHG | RESPIRATION RATE: 16 BRPM | OXYGEN SATURATION: 100 % | SYSTOLIC BLOOD PRESSURE: 172 MMHG

## 2017-05-29 VITALS
SYSTOLIC BLOOD PRESSURE: 144 MMHG | DIASTOLIC BLOOD PRESSURE: 61 MMHG | RESPIRATION RATE: 16 BRPM | OXYGEN SATURATION: 100 % | HEART RATE: 89 BPM

## 2017-05-29 LAB
ALBUMIN SERPL ELPH-MCNC: 4.9 G/DL — SIGNIFICANT CHANGE UP (ref 3.3–5)
ALP SERPL-CCNC: 50 U/L — SIGNIFICANT CHANGE UP (ref 40–120)
ALT FLD-CCNC: 18 U/L — SIGNIFICANT CHANGE UP (ref 4–33)
APPEARANCE UR: CLEAR — SIGNIFICANT CHANGE UP
AST SERPL-CCNC: 22 U/L — SIGNIFICANT CHANGE UP (ref 4–32)
BASE EXCESS BLDV CALC-SCNC: 1.4 MMOL/L — SIGNIFICANT CHANGE UP
BASE EXCESS BLDV CALC-SCNC: 3.2 MMOL/L — SIGNIFICANT CHANGE UP
BASOPHILS # BLD AUTO: 0 K/UL — SIGNIFICANT CHANGE UP (ref 0–0.2)
BASOPHILS NFR BLD AUTO: 0 % — SIGNIFICANT CHANGE UP (ref 0–2)
BILIRUB SERPL-MCNC: 0.6 MG/DL — SIGNIFICANT CHANGE UP (ref 0.2–1.2)
BILIRUB UR-MCNC: NEGATIVE — SIGNIFICANT CHANGE UP
BLOOD GAS VENOUS - CREATININE: 0.68 MG/DL — SIGNIFICANT CHANGE UP (ref 0.5–1.3)
BLOOD GAS VENOUS - CREATININE: 0.74 MG/DL — SIGNIFICANT CHANGE UP (ref 0.5–1.3)
BLOOD UR QL VISUAL: NEGATIVE — SIGNIFICANT CHANGE UP
BUN SERPL-MCNC: 9 MG/DL — SIGNIFICANT CHANGE UP (ref 7–23)
CALCIUM SERPL-MCNC: 9.5 MG/DL — SIGNIFICANT CHANGE UP (ref 8.4–10.5)
CHLORIDE BLDV-SCNC: 94 MMOL/L — LOW (ref 96–108)
CHLORIDE BLDV-SCNC: 99 MMOL/L — SIGNIFICANT CHANGE UP (ref 96–108)
CHLORIDE SERPL-SCNC: 88 MMOL/L — LOW (ref 98–107)
CO2 SERPL-SCNC: 23 MMOL/L — SIGNIFICANT CHANGE UP (ref 22–31)
COLOR SPEC: SIGNIFICANT CHANGE UP
CREAT SERPL-MCNC: 0.87 MG/DL — SIGNIFICANT CHANGE UP (ref 0.5–1.3)
EOSINOPHIL # BLD AUTO: 0.04 K/UL — SIGNIFICANT CHANGE UP (ref 0–0.5)
EOSINOPHIL NFR BLD AUTO: 0.4 % — SIGNIFICANT CHANGE UP (ref 0–6)
GAS PNL BLDV: 125 MMOL/L — LOW (ref 136–146)
GAS PNL BLDV: 125 MMOL/L — LOW (ref 136–146)
GLUCOSE BLDV-MCNC: 158 — HIGH (ref 70–99)
GLUCOSE BLDV-MCNC: 90 — SIGNIFICANT CHANGE UP (ref 70–99)
GLUCOSE SERPL-MCNC: 153 MG/DL — HIGH (ref 70–99)
GLUCOSE UR-MCNC: NEGATIVE — SIGNIFICANT CHANGE UP
HCO3 BLDV-SCNC: 24 MMOL/L — SIGNIFICANT CHANGE UP (ref 20–27)
HCO3 BLDV-SCNC: 25 MMOL/L — SIGNIFICANT CHANGE UP (ref 20–27)
HCT VFR BLD CALC: 38.8 % — SIGNIFICANT CHANGE UP (ref 34.5–45)
HCT VFR BLDV CALC: 37.3 % — SIGNIFICANT CHANGE UP (ref 34.5–45)
HCT VFR BLDV CALC: 43.1 % — SIGNIFICANT CHANGE UP (ref 34.5–45)
HGB BLD-MCNC: 13.5 G/DL — SIGNIFICANT CHANGE UP (ref 11.5–15.5)
HGB BLDV-MCNC: 12.1 G/DL — SIGNIFICANT CHANGE UP (ref 11.5–15.5)
HGB BLDV-MCNC: 14 G/DL — SIGNIFICANT CHANGE UP (ref 11.5–15.5)
IMM GRANULOCYTES NFR BLD AUTO: 0.2 % — SIGNIFICANT CHANGE UP (ref 0–1.5)
KETONES UR-MCNC: NEGATIVE — SIGNIFICANT CHANGE UP
LACTATE BLDV-MCNC: 1.4 MMOL/L — SIGNIFICANT CHANGE UP (ref 0.5–2)
LACTATE BLDV-MCNC: 2.9 MMOL/L — HIGH (ref 0.5–2)
LEUKOCYTE ESTERASE UR-ACNC: NEGATIVE — SIGNIFICANT CHANGE UP
LIDOCAIN IGE QN: 96.1 U/L — HIGH (ref 7–60)
LYMPHOCYTES # BLD AUTO: 2.65 K/UL — SIGNIFICANT CHANGE UP (ref 1–3.3)
LYMPHOCYTES # BLD AUTO: 28.6 % — SIGNIFICANT CHANGE UP (ref 13–44)
MCHC RBC-ENTMCNC: 31.2 PG — SIGNIFICANT CHANGE UP (ref 27–34)
MCHC RBC-ENTMCNC: 34.8 % — SIGNIFICANT CHANGE UP (ref 32–36)
MCV RBC AUTO: 89.6 FL — SIGNIFICANT CHANGE UP (ref 80–100)
MONOCYTES # BLD AUTO: 0.6 K/UL — SIGNIFICANT CHANGE UP (ref 0–0.9)
MONOCYTES NFR BLD AUTO: 6.5 % — SIGNIFICANT CHANGE UP (ref 2–14)
MUCOUS THREADS # UR AUTO: SIGNIFICANT CHANGE UP
NEUTROPHILS # BLD AUTO: 5.97 K/UL — SIGNIFICANT CHANGE UP (ref 1.8–7.4)
NEUTROPHILS NFR BLD AUTO: 64.3 % — SIGNIFICANT CHANGE UP (ref 43–77)
NITRITE UR-MCNC: NEGATIVE — SIGNIFICANT CHANGE UP
OB PNL STL: NEGATIVE — SIGNIFICANT CHANGE UP
PCO2 BLDV: 40 MMHG — LOW (ref 41–51)
PCO2 BLDV: 41 MMHG — SIGNIFICANT CHANGE UP (ref 41–51)
PH BLDV: 7.41 PH — SIGNIFICANT CHANGE UP (ref 7.32–7.43)
PH BLDV: 7.45 PH — HIGH (ref 7.32–7.43)
PH UR: 8 — SIGNIFICANT CHANGE UP (ref 4.6–8)
PLATELET # BLD AUTO: 233 K/UL — SIGNIFICANT CHANGE UP (ref 150–400)
PMV BLD: 10 FL — SIGNIFICANT CHANGE UP (ref 7–13)
PO2 BLDV: < 24 MMHG — LOW (ref 35–40)
PO2 BLDV: < 24 MMHG — LOW (ref 35–40)
POTASSIUM BLDV-SCNC: 3.7 MMOL/L — SIGNIFICANT CHANGE UP (ref 3.4–4.5)
POTASSIUM BLDV-SCNC: 4.3 MMOL/L — SIGNIFICANT CHANGE UP (ref 3.4–4.5)
POTASSIUM SERPL-MCNC: 4.7 MMOL/L — SIGNIFICANT CHANGE UP (ref 3.5–5.3)
POTASSIUM SERPL-SCNC: 4.7 MMOL/L — SIGNIFICANT CHANGE UP (ref 3.5–5.3)
PROT SERPL-MCNC: 9.1 G/DL — HIGH (ref 6–8.3)
PROT UR-MCNC: 20 — SIGNIFICANT CHANGE UP
RBC # BLD: 4.33 M/UL — SIGNIFICANT CHANGE UP (ref 3.8–5.2)
RBC # FLD: 12.4 % — SIGNIFICANT CHANGE UP (ref 10.3–14.5)
RBC CASTS # UR COMP ASSIST: SIGNIFICANT CHANGE UP (ref 0–?)
SAO2 % BLDV: 15.2 % — LOW (ref 60–85)
SAO2 % BLDV: 25.2 % — LOW (ref 60–85)
SODIUM SERPL-SCNC: 130 MMOL/L — LOW (ref 135–145)
SP GR SPEC: 1.01 — SIGNIFICANT CHANGE UP (ref 1–1.03)
SQUAMOUS # UR AUTO: SIGNIFICANT CHANGE UP
UROBILINOGEN FLD QL: NORMAL E.U. — SIGNIFICANT CHANGE UP (ref 0.1–0.2)
WBC # BLD: 9.28 K/UL — SIGNIFICANT CHANGE UP (ref 3.8–10.5)
WBC # FLD AUTO: 9.28 K/UL — SIGNIFICANT CHANGE UP (ref 3.8–10.5)

## 2017-05-29 PROCEDURE — 99285 EMERGENCY DEPT VISIT HI MDM: CPT | Mod: GC

## 2017-05-29 PROCEDURE — 74177 CT ABD & PELVIS W/CONTRAST: CPT | Mod: 26

## 2017-05-29 RX ORDER — MORPHINE SULFATE 50 MG/1
2 CAPSULE, EXTENDED RELEASE ORAL ONCE
Qty: 0 | Refills: 0 | Status: DISCONTINUED | OUTPATIENT
Start: 2017-05-29 | End: 2017-05-29

## 2017-05-29 RX ORDER — SODIUM CHLORIDE 9 MG/ML
1000 INJECTION INTRAMUSCULAR; INTRAVENOUS; SUBCUTANEOUS ONCE
Qty: 0 | Refills: 0 | Status: COMPLETED | OUTPATIENT
Start: 2017-05-29 | End: 2017-05-29

## 2017-05-29 RX ORDER — DEXTROSE 50 % IN WATER 50 %
25 SYRINGE (ML) INTRAVENOUS ONCE
Qty: 0 | Refills: 0 | Status: COMPLETED | OUTPATIENT
Start: 2017-05-29 | End: 2017-05-29

## 2017-05-29 RX ADMIN — MORPHINE SULFATE 2 MILLIGRAM(S): 50 CAPSULE, EXTENDED RELEASE ORAL at 10:05

## 2017-05-29 RX ADMIN — Medication 1 TABLET(S): at 13:49

## 2017-05-29 RX ADMIN — Medication 25 MILLILITER(S): at 12:53

## 2017-05-29 RX ADMIN — SODIUM CHLORIDE 1000 MILLILITER(S): 9 INJECTION INTRAMUSCULAR; INTRAVENOUS; SUBCUTANEOUS at 10:05

## 2017-05-29 NOTE — ED PROVIDER NOTE - ATTENDING CONTRIBUTION TO CARE
DR. STANLEY, ATTENDING MD-  I performed a face to face bedside interview with patient regarding history of present illness, review of symptoms and past medical history. I completed an independent physical exam.  I have discussed patient's plan of care with the resident.   Documentation as above in the note.   HPI: 85 yo F with DM, HTN, HLP that presents with abd pain, started last night, RLQ and LLQ otherwise non radiating, no nausea or vomiting but today had one episode watery dark diarrhea. Also with dizziness. No chest pain, SOB, trauma, fevers, chills. Not on Iron, not on blood thinners. pt reports s/p cholecystectomy at [facility] years ago. Was admitted here Jan 2017 and given cipro/flagyl for colitis.  EXAM: Well healed surgical scar RLQ. tenderness to palpation RLQ and LLQ. No distension, no rebound, no guarding.   MDM: 85 yo F with DM, HTN, HLP that presents with abd pain. Previous colitis. one episode dark stool diarrhea. Will obtain labs and imaging (CT abd) and reassess. Appears well otherwise. DR. STANLEY, ATTENDING MD-  I performed a face to face bedside interview with patient regarding history of present illness, review of symptoms and past medical history. I completed an independent physical exam.  I have discussed patient's plan of care with the resident.   Documentation as above in the note.   HPI: 85 yo F with DM, HTN, HLP that presents with abd pain, started last night, RLQ and LLQ otherwise non radiating, no nausea or vomiting but today had one episode watery dark diarrhea. Also with dizziness. No chest pain, SOB, trauma, fevers, chills. Not on Iron, not on blood thinners. pt reports s/p cholecystectomy at [facility] 20 years ago. Was admitted here Jan 2017 and given cipro/flagyl for colitis.  EXAM: Well healed surgical scar RLQ. tenderness to palpation RLQ and LLQ. No distension, no rebound, no guarding.   MDM: 85 yo F with DM, HTN, HLP that presents with abd pain. Previous colitis. one episode dark stool diarrhea. Will obtain labs and imaging (CT abd) and reassess. Appears well otherwise.  Guiaic NEG, H/H normal. CT shows ? Right colitis. Will send home PO Abx and f/u with PMD. No fever, tolerating PO here, no N/V/D, pt hungry, ate in ED. Has had before. Has good f/u with PMD. Treated with PO abx in past and resolved. Will send home with same and f/u with PMD.

## 2017-05-29 NOTE — ED PROVIDER NOTE - ABDOMINAL TENDER
umbilical/suprapubic/right sided scar from umbilicus to lower abdomen/right lower quadrant/left lower quadrant

## 2017-05-29 NOTE — ED PROVIDER NOTE - OBJECTIVE STATEMENT
85 yo F with h/o DM, HTN, HLD, s/p tam, s/p umbilical hernia repair p/w abdominal pain and diarrhea x 1 day.  Pt endorses loose dark diarrhea x 2 episodes and lower abdominal pain. Seen multiple times in past for similar symptoms. Diagnosed with non-specific colitis in the past. Denies fevers, chills, cough, rhinorrhea, otorrhea, otalgia, nausea, vomiting, constipation, diarrhea, chest pain, shortness of breath or changes in urinary habits. Pt also complains of gradually worsening headache, which has improved slightly with tylenol

## 2017-11-15 ENCOUNTER — EMERGENCY (EMERGENCY)
Facility: HOSPITAL | Age: 82
LOS: 1 days | Discharge: HOME | End: 2017-11-15
Attending: EMERGENCY MEDICINE | Admitting: EMERGENCY MEDICINE
Payer: MEDICARE

## 2017-11-15 VITALS
OXYGEN SATURATION: 99 % | RESPIRATION RATE: 18 BRPM | HEART RATE: 84 BPM | TEMPERATURE: 98 F | SYSTOLIC BLOOD PRESSURE: 161 MMHG | DIASTOLIC BLOOD PRESSURE: 87 MMHG

## 2017-11-15 LAB
ALBUMIN SERPL ELPH-MCNC: 4.1 G/DL — SIGNIFICANT CHANGE UP (ref 3.3–5)
ALP SERPL-CCNC: 37 U/L — LOW (ref 40–120)
ALT FLD-CCNC: 15 U/L — SIGNIFICANT CHANGE UP (ref 4–33)
APPEARANCE UR: CLEAR — SIGNIFICANT CHANGE UP
AST SERPL-CCNC: 31 U/L — SIGNIFICANT CHANGE UP (ref 4–32)
BASE EXCESS BLDV CALC-SCNC: -1.1 MMOL/L — SIGNIFICANT CHANGE UP
BASE EXCESS BLDV CALC-SCNC: -1.8 MMOL/L — SIGNIFICANT CHANGE UP
BASE EXCESS BLDV CALC-SCNC: 0 MMOL/L — SIGNIFICANT CHANGE UP
BASE EXCESS BLDV CALC-SCNC: 1.6 MMOL/L — SIGNIFICANT CHANGE UP
BASOPHILS # BLD AUTO: 0.01 K/UL — SIGNIFICANT CHANGE UP (ref 0–0.2)
BASOPHILS NFR BLD AUTO: 0.1 % — SIGNIFICANT CHANGE UP (ref 0–2)
BILIRUB SERPL-MCNC: 0.4 MG/DL — SIGNIFICANT CHANGE UP (ref 0.2–1.2)
BILIRUB UR-MCNC: NEGATIVE — SIGNIFICANT CHANGE UP
BLOOD GAS VENOUS - CREATININE: 0.65 MG/DL — SIGNIFICANT CHANGE UP (ref 0.5–1.3)
BLOOD GAS VENOUS - CREATININE: 0.66 MG/DL — SIGNIFICANT CHANGE UP (ref 0.5–1.3)
BLOOD GAS VENOUS - CREATININE: 0.66 MG/DL — SIGNIFICANT CHANGE UP (ref 0.5–1.3)
BLOOD GAS VENOUS - CREATININE: 0.74 MG/DL — SIGNIFICANT CHANGE UP (ref 0.5–1.3)
BLOOD UR QL VISUAL: NEGATIVE — SIGNIFICANT CHANGE UP
BUN SERPL-MCNC: 11 MG/DL — SIGNIFICANT CHANGE UP (ref 7–23)
CALCIUM SERPL-MCNC: 8.6 MG/DL — SIGNIFICANT CHANGE UP (ref 8.4–10.5)
CHLORIDE BLDV-SCNC: 100 MMOL/L — SIGNIFICANT CHANGE UP (ref 96–108)
CHLORIDE BLDV-SCNC: 102 MMOL/L — SIGNIFICANT CHANGE UP (ref 96–108)
CHLORIDE BLDV-SCNC: 103 MMOL/L — SIGNIFICANT CHANGE UP (ref 96–108)
CHLORIDE BLDV-SCNC: 105 MMOL/L — SIGNIFICANT CHANGE UP (ref 96–108)
CHLORIDE SERPL-SCNC: 96 MMOL/L — LOW (ref 98–107)
CO2 SERPL-SCNC: 22 MMOL/L — SIGNIFICANT CHANGE UP (ref 22–31)
COLOR SPEC: SIGNIFICANT CHANGE UP
CREAT SERPL-MCNC: 0.8 MG/DL — SIGNIFICANT CHANGE UP (ref 0.5–1.3)
EOSINOPHIL # BLD AUTO: 0.06 K/UL — SIGNIFICANT CHANGE UP (ref 0–0.5)
EOSINOPHIL NFR BLD AUTO: 0.7 % — SIGNIFICANT CHANGE UP (ref 0–6)
GAS PNL BLDV: 128 MMOL/L — LOW (ref 136–146)
GAS PNL BLDV: 129 MMOL/L — LOW (ref 136–146)
GAS PNL BLDV: 129 MMOL/L — LOW (ref 136–146)
GAS PNL BLDV: 132 MMOL/L — LOW (ref 136–146)
GLUCOSE BLDV-MCNC: 111 — HIGH (ref 70–99)
GLUCOSE BLDV-MCNC: 135 — HIGH (ref 70–99)
GLUCOSE BLDV-MCNC: 160 — HIGH (ref 70–99)
GLUCOSE BLDV-MCNC: 88 — SIGNIFICANT CHANGE UP (ref 70–99)
GLUCOSE SERPL-MCNC: 116 MG/DL — HIGH (ref 70–99)
GLUCOSE UR-MCNC: NEGATIVE — SIGNIFICANT CHANGE UP
HCO3 BLDV-SCNC: 21 MMOL/L — SIGNIFICANT CHANGE UP (ref 20–27)
HCO3 BLDV-SCNC: 22 MMOL/L — SIGNIFICANT CHANGE UP (ref 20–27)
HCO3 BLDV-SCNC: 23 MMOL/L — SIGNIFICANT CHANGE UP (ref 20–27)
HCO3 BLDV-SCNC: 25 MMOL/L — SIGNIFICANT CHANGE UP (ref 20–27)
HCT VFR BLD CALC: 35.2 % — SIGNIFICANT CHANGE UP (ref 34.5–45)
HCT VFR BLDV CALC: 32 % — LOW (ref 34.5–45)
HCT VFR BLDV CALC: 40 % — SIGNIFICANT CHANGE UP (ref 34.5–45)
HCT VFR BLDV CALC: 40.3 % — SIGNIFICANT CHANGE UP (ref 34.5–45)
HCT VFR BLDV CALC: 41.4 % — SIGNIFICANT CHANGE UP (ref 34.5–45)
HGB BLD-MCNC: 12.1 G/DL — SIGNIFICANT CHANGE UP (ref 11.5–15.5)
HGB BLDV-MCNC: 10.4 G/DL — LOW (ref 11.5–15.5)
HGB BLDV-MCNC: 13 G/DL — SIGNIFICANT CHANGE UP (ref 11.5–15.5)
HGB BLDV-MCNC: 13.1 G/DL — SIGNIFICANT CHANGE UP (ref 11.5–15.5)
HGB BLDV-MCNC: 13.5 G/DL — SIGNIFICANT CHANGE UP (ref 11.5–15.5)
IMM GRANULOCYTES # BLD AUTO: 0.02 # — SIGNIFICANT CHANGE UP
IMM GRANULOCYTES NFR BLD AUTO: 0.2 % — SIGNIFICANT CHANGE UP (ref 0–1.5)
KETONES UR-MCNC: NEGATIVE — SIGNIFICANT CHANGE UP
LACTATE BLDV-MCNC: 2.1 MMOL/L — HIGH (ref 0.5–2)
LACTATE BLDV-MCNC: 2.5 MMOL/L — HIGH (ref 0.5–2)
LACTATE BLDV-MCNC: 3.3 MMOL/L — HIGH (ref 0.5–2)
LACTATE BLDV-MCNC: 3.4 MMOL/L — HIGH (ref 0.5–2)
LEUKOCYTE ESTERASE UR-ACNC: NEGATIVE — SIGNIFICANT CHANGE UP
LIDOCAIN IGE QN: 80.4 U/L — HIGH (ref 7–60)
LYMPHOCYTES # BLD AUTO: 3.34 K/UL — HIGH (ref 1–3.3)
LYMPHOCYTES # BLD AUTO: 38.1 % — SIGNIFICANT CHANGE UP (ref 13–44)
MCHC RBC-ENTMCNC: 30.9 PG — SIGNIFICANT CHANGE UP (ref 27–34)
MCHC RBC-ENTMCNC: 34.4 % — SIGNIFICANT CHANGE UP (ref 32–36)
MCV RBC AUTO: 89.8 FL — SIGNIFICANT CHANGE UP (ref 80–100)
MONOCYTES # BLD AUTO: 0.72 K/UL — SIGNIFICANT CHANGE UP (ref 0–0.9)
MONOCYTES NFR BLD AUTO: 8.2 % — SIGNIFICANT CHANGE UP (ref 2–14)
NEUTROPHILS # BLD AUTO: 4.61 K/UL — SIGNIFICANT CHANGE UP (ref 1.8–7.4)
NEUTROPHILS NFR BLD AUTO: 52.7 % — SIGNIFICANT CHANGE UP (ref 43–77)
NITRITE UR-MCNC: NEGATIVE — SIGNIFICANT CHANGE UP
NRBC # FLD: 0 — SIGNIFICANT CHANGE UP
PCO2 BLDV: 44 MMHG — SIGNIFICANT CHANGE UP (ref 41–51)
PCO2 BLDV: 46 MMHG — SIGNIFICANT CHANGE UP (ref 41–51)
PCO2 BLDV: 49 MMHG — SIGNIFICANT CHANGE UP (ref 41–51)
PCO2 BLDV: 53 MMHG — HIGH (ref 41–51)
PH BLDV: 7.28 PH — LOW (ref 7.32–7.43)
PH BLDV: 7.33 PH — SIGNIFICANT CHANGE UP (ref 7.32–7.43)
PH BLDV: 7.34 PH — SIGNIFICANT CHANGE UP (ref 7.32–7.43)
PH BLDV: 7.39 PH — SIGNIFICANT CHANGE UP (ref 7.32–7.43)
PH UR: 6.5 — SIGNIFICANT CHANGE UP (ref 4.6–8)
PLATELET # BLD AUTO: 173 K/UL — SIGNIFICANT CHANGE UP (ref 150–400)
PMV BLD: 10.6 FL — SIGNIFICANT CHANGE UP (ref 7–13)
PO2 BLDV: 26 MMHG — LOW (ref 35–40)
PO2 BLDV: 30 MMHG — LOW (ref 35–40)
PO2 BLDV: 31 MMHG — LOW (ref 35–40)
PO2 BLDV: 43 MMHG — HIGH (ref 35–40)
POTASSIUM BLDV-SCNC: 3.9 MMOL/L — SIGNIFICANT CHANGE UP (ref 3.4–4.5)
POTASSIUM BLDV-SCNC: 4.4 MMOL/L — SIGNIFICANT CHANGE UP (ref 3.4–4.5)
POTASSIUM SERPL-MCNC: 5.3 MMOL/L — SIGNIFICANT CHANGE UP (ref 3.5–5.3)
POTASSIUM SERPL-SCNC: 5.3 MMOL/L — SIGNIFICANT CHANGE UP (ref 3.5–5.3)
PROT SERPL-MCNC: 7.7 G/DL — SIGNIFICANT CHANGE UP (ref 6–8.3)
PROT UR-MCNC: NEGATIVE — SIGNIFICANT CHANGE UP
RBC # BLD: 3.92 M/UL — SIGNIFICANT CHANGE UP (ref 3.8–5.2)
RBC # FLD: 12.2 % — SIGNIFICANT CHANGE UP (ref 10.3–14.5)
RBC CASTS # UR COMP ASSIST: SIGNIFICANT CHANGE UP (ref 0–?)
SAO2 % BLDV: 39.7 % — LOW (ref 60–85)
SAO2 % BLDV: 48.5 % — LOW (ref 60–85)
SAO2 % BLDV: 48.7 % — LOW (ref 60–85)
SAO2 % BLDV: 78.7 % — SIGNIFICANT CHANGE UP (ref 60–85)
SODIUM SERPL-SCNC: 132 MMOL/L — LOW (ref 135–145)
SP GR SPEC: 1.02 — SIGNIFICANT CHANGE UP (ref 1–1.03)
SQUAMOUS # UR AUTO: SIGNIFICANT CHANGE UP
UROBILINOGEN FLD QL: NORMAL E.U. — SIGNIFICANT CHANGE UP (ref 0.1–0.2)
WBC # BLD: 8.76 K/UL — SIGNIFICANT CHANGE UP (ref 3.8–10.5)
WBC # FLD AUTO: 8.76 K/UL — SIGNIFICANT CHANGE UP (ref 3.8–10.5)
WBC UR QL: SIGNIFICANT CHANGE UP (ref 0–?)

## 2017-11-15 PROCEDURE — 74177 CT ABD & PELVIS W/CONTRAST: CPT | Mod: 26

## 2017-11-15 PROCEDURE — 99220: CPT | Mod: GC

## 2017-11-15 RX ORDER — CIPROFLOXACIN LACTATE 400MG/40ML
400 VIAL (ML) INTRAVENOUS EVERY 12 HOURS
Qty: 0 | Refills: 0 | Status: DISCONTINUED | OUTPATIENT
Start: 2017-11-15 | End: 2017-11-19

## 2017-11-15 RX ORDER — SODIUM CHLORIDE 9 MG/ML
500 INJECTION INTRAMUSCULAR; INTRAVENOUS; SUBCUTANEOUS ONCE
Qty: 0 | Refills: 0 | Status: COMPLETED | OUTPATIENT
Start: 2017-11-15 | End: 2017-11-15

## 2017-11-15 RX ORDER — METRONIDAZOLE 500 MG
500 TABLET ORAL ONCE
Qty: 0 | Refills: 0 | Status: COMPLETED | OUTPATIENT
Start: 2017-11-15 | End: 2017-11-15

## 2017-11-15 RX ORDER — ATORVASTATIN CALCIUM 80 MG/1
10 TABLET, FILM COATED ORAL AT BEDTIME
Qty: 0 | Refills: 0 | Status: DISCONTINUED | OUTPATIENT
Start: 2017-11-15 | End: 2017-11-19

## 2017-11-15 RX ORDER — SODIUM CHLORIDE 9 MG/ML
1000 INJECTION INTRAMUSCULAR; INTRAVENOUS; SUBCUTANEOUS ONCE
Qty: 0 | Refills: 0 | Status: COMPLETED | OUTPATIENT
Start: 2017-11-15 | End: 2017-11-15

## 2017-11-15 RX ORDER — METRONIDAZOLE 500 MG
500 TABLET ORAL EVERY 8 HOURS
Qty: 0 | Refills: 0 | Status: DISCONTINUED | OUTPATIENT
Start: 2017-11-16 | End: 2017-11-19

## 2017-11-15 RX ORDER — METRONIDAZOLE 500 MG
TABLET ORAL
Qty: 0 | Refills: 0 | Status: DISCONTINUED | OUTPATIENT
Start: 2017-11-15 | End: 2017-11-19

## 2017-11-15 RX ORDER — FAMOTIDINE 10 MG/ML
20 INJECTION INTRAVENOUS ONCE
Qty: 0 | Refills: 0 | Status: COMPLETED | OUTPATIENT
Start: 2017-11-15 | End: 2017-11-15

## 2017-11-15 RX ORDER — METFORMIN HYDROCHLORIDE 850 MG/1
500 TABLET ORAL
Qty: 0 | Refills: 0 | Status: DISCONTINUED | OUTPATIENT
Start: 2017-11-15 | End: 2017-11-19

## 2017-11-15 RX ORDER — VALSARTAN 80 MG/1
80 TABLET ORAL DAILY
Qty: 0 | Refills: 0 | Status: DISCONTINUED | OUTPATIENT
Start: 2017-11-15 | End: 2017-11-19

## 2017-11-15 RX ORDER — INSULIN GLARGINE 100 [IU]/ML
12 INJECTION, SOLUTION SUBCUTANEOUS AT BEDTIME
Qty: 0 | Refills: 0 | Status: DISCONTINUED | OUTPATIENT
Start: 2017-11-15 | End: 2017-11-19

## 2017-11-15 RX ADMIN — INSULIN GLARGINE 12 UNIT(S): 100 INJECTION, SOLUTION SUBCUTANEOUS at 23:00

## 2017-11-15 RX ADMIN — ATORVASTATIN CALCIUM 10 MILLIGRAM(S): 80 TABLET, FILM COATED ORAL at 22:44

## 2017-11-15 RX ADMIN — SODIUM CHLORIDE 500 MILLILITER(S): 9 INJECTION INTRAMUSCULAR; INTRAVENOUS; SUBCUTANEOUS at 13:33

## 2017-11-15 RX ADMIN — METFORMIN HYDROCHLORIDE 500 MILLIGRAM(S): 850 TABLET ORAL at 18:56

## 2017-11-15 RX ADMIN — FAMOTIDINE 20 MILLIGRAM(S): 10 INJECTION INTRAVENOUS at 11:25

## 2017-11-15 RX ADMIN — Medication 100 MILLIGRAM(S): at 18:55

## 2017-11-15 RX ADMIN — SODIUM CHLORIDE 500 MILLILITER(S): 9 INJECTION INTRAMUSCULAR; INTRAVENOUS; SUBCUTANEOUS at 11:25

## 2017-11-15 RX ADMIN — VALSARTAN 80 MILLIGRAM(S): 80 TABLET ORAL at 19:08

## 2017-11-15 RX ADMIN — SODIUM CHLORIDE 1000 MILLILITER(S): 9 INJECTION INTRAMUSCULAR; INTRAVENOUS; SUBCUTANEOUS at 18:56

## 2017-11-15 RX ADMIN — Medication 200 MILLIGRAM(S): at 21:00

## 2017-11-15 NOTE — ED CDU PROVIDER INITIAL DAY NOTE - ATTENDING CONTRIBUTION TO CARE
ED Attending (Dr Suarez): I have personally performed a face to face bedside history and physical examination of this patient.  I have discussed the case with the PA and  I have personally authored the following components: HPI, ROS, Physical Exam and MDM in addition to reviewing and revising the rest of the Provider Note.   Abd pain, possible colitis on ct with rising lactate - will give IVF, repeat lacate level right after fluids, serial abd exams, consider ct angio abd sv surgical c/s if continues to rise to ro ischemic dz,

## 2017-11-15 NOTE — ED PROVIDER NOTE - OBJECTIVE STATEMENT
Pt is 87 y/o female with Pmhx of HTN, colitis, dyslipidemia, DM here for eval of abd pain x 2 wks, worse for the past 2 days. as per pt she has been having intermittent pain to lower abd area (Rt>Lt), worse for the past 2 days, similar to the pain pt experienced in the past with colitis. Pt denies fever, chills, denies nausea, vomiting, diarrhea or constipation, had normal BM today am and reports normal flatus. (-) urinary sx, recent travel or sick contact. good po intake. (-) melena. Pt is 87 y/o female with Pmhx of HTN, colitis, dyslipidemia, DM here for eval of abd pain x 2 wks, worse for the past 2 days. as per pt she has been having intermittent pain to lower abd area (Rt>Lt), worse for the past 2 days, similar to the pain pt experienced in the past with colitis. Pt denies fever, chills, denies nausea, vomiting, diarrhea or constipation, had normal BM today am and reports normal flatus. (-) urinary sx, recent travel or sick contact. good po intake. (-) melena.  Attending - Agree with above.  I evaluated patient myself.  87 y/o F with hx as noted.  Reports b/l lower abd pain on/off x 2 weeks, more severe and constant x 1 day.  Reports similar to previous diagnosis of colitis.  Denies fever, n/v/d.  Normal PO intake.  No chest pain or shortness of breathe.  No other complaint.  Denies dysuria or hematuria.  No vag bleeding or discharge.

## 2017-11-15 NOTE — ED CDU PROVIDER INITIAL DAY NOTE - MEDICAL DECISION MAKING DETAILS
abd pain, colitis on ct with rising lactate- will give IVF, repeat lacate level right after fluids, serial abd exams, ct angio  abd if continues to rise to ro ischemic dz, Abd pain, possible colitis on ct with rising lactate - will give IVF, repeat lacate level right after fluids, serial abd exams, consider ct angio abd sv surgical c/s if continues to rise to ro ischemic dz,

## 2017-11-15 NOTE — ED PROVIDER NOTE - MEDICAL DECISION MAKING DETAILS
abd pain, pos colitis flare up - labs, pain control, gentle iv hydration, ct abd/pelvis will reasses.

## 2017-11-15 NOTE — ED CDU PROVIDER INITIAL DAY NOTE - OBJECTIVE STATEMENT
Pt is 87 y/o female with Pmhx of HTN, colitis, dyslipidemia, DM here for eval of abd pain x 2 wks, worse for the past 2 days. as per pt she has been having intermittent pain to lower abd area (Rt>Lt), worse for the past 2 days, similar to the pain pt experienced in the past with colitis. Pt denies fever, chills, denies nausea, vomiting, diarrhea or constipation, had normal BM today am and reports normal flatus. (-) urinary sx, recent travel or sick contact. good po intake. (-) melena.  Attending - Agree with above.  I evaluated patient myself.  87 y/o F with hx as noted.  Reports b/l lower abd pain on/off x 2 weeks, more severe and constant x 1 day.  Reports similar to previous diagnosis of colitis.  Denies fever, n/v/d.  Normal PO intake.  No chest pain or shortness of breathe.  No other complaint.  Denies dysuria or hematuria.  No vag bleeding or discharge. Pt is 87 y/o female with Pmhx of HTN, colitis, dyslipidemia, DM here for eval of abd pain x 2 wks, worse for the past 2 days. as per pt she has been having intermittent pain to lower abd area (Rt>Lt), worse for the past 2 days, similar to the pain pt experienced in the past with colitis. Pt denies fever, chills, denies nausea, vomiting, diarrhea or constipation, had normal BM today am and reports normal flatus. (-) urinary sx, recent travel or sick contact. good po intake. (-) melena. At current time patient resting comfortably, and  pain has improved to a 2/10 Pt is 87 y/o female with Pmhx of HTN, colitis, dyslipidemia, DM here for eval of abd pain x 2 wks, worse for the past 2 days. as per pt she has been having intermittent pain to lower abd area (Rt>Lt), worse for the past 2 days, similar to the pain pt experienced in the past with colitis. Pt denies fever, chills, denies nausea, vomiting, diarrhea or constipation, had normal BM today am and reports normal flatus. (-) urinary sx, recent travel or sick contact. good po intake. (-) melena. At current time patient resting comfortably, and  pain has improved to a 2/10, Pt is 87 y/o female with Pmhx of HTN, colitis (unclear etiology, recurrent), dyslipidemia, DM here for eval of abd pain x 2 wks, worse for the past 2 days. as per pt she has been having intermittent pain to lower abd area (Rt>Lt), worse for the past 2 days, similar to the pain pt experienced in the past with colitis. Pt denies fever, chills, denies nausea, vomiting, diarrhea or constipation, had normal BM today am and reports normal flatus. (-) urinary sx, recent travel or sick contact. good po intake. (-) melena. At current time patient resting comfortably, and  pain has improved to a 2/10, In ED coty parker and CT revealed possible colitis.  Pt's PMD requesting Abx, per primary team. However, Lactate which was initially minimally elevated, trended up through stay despite improving Sx.  Was sent to CDu to reassess condition and to monitor lactatye following hydration.  Had a CTA negative for mesenteric ischemia in past, but would consider if worsening.

## 2017-11-15 NOTE — ED PROVIDER NOTE - PHYSICAL EXAMINATION
non-toxic appearing, rests comfortably on the stretcher  (-) CVAT b/l non-toxic appearing, rests comfortably on the stretcher  (-) CVAT b/l  ATTENDING PHYSICAL EXAM DR. Horvath ***GEN - NAD; well appearing; A+O x3 ***HEAD - NC/AT ***EYES/NOSE - PERRL, EOMI, mucous membranes moist, no discharge ***THROAT: Oral cavity and pharynx normal. No inflammation, swelling, exudate, or lesions.  ***NECK: Neck supple, non-tender without lymphadenopathy, no masses, no JVD.   ***PULMONARY - CTA b/l, symmetric breath sounds. ***CARDIAC -s1s2, RRR, no M,R,G  ***ABDOMEN - noted old surg scar, +BS, ND, NT, soft, no guarding, no rebound, no masses   ***BACK - no CVA tenderness, Normal  spine ***EXTREMITIES - symmetric pulses, 2+ dp, capillary refill < 2 seconds, no clubbing, no cyanosis, no edema ***SKIN - no rash or bruising   ***NEUROLOGIC - alert, CN 2-12 intact

## 2017-11-15 NOTE — ED ADULT NURSE NOTE - OBJECTIVE STATEMENT
Pt received to spot 28 complaining of intermittent abdominal pain x2 weeks. pt states she has a hx of colitis. pt denies N/V/D, chest pain, SOB. Pt states she had 1 episode of diarrhea yesterday. IV access obtained, labs drawn and sent.

## 2017-11-15 NOTE — ED CDU PROVIDER INITIAL DAY NOTE - PROGRESS NOTE DETAILS
ARMANDO Barlow Addendum-----  This patient was signed out to me by CDU ARMANDO Bella and CDU day attending Dr. Suarez on 11/15/17 at 1900 hrs.; test results reviewed.  In interim, pt has been resting comfortably; no c/o or issues to date.  Lactate this evening 3.4 (prior was 3.3).  Pt. objectively appears well / comfortable, VS have been stable; will continue current management plan and continue to monitor/reassess.

## 2017-11-16 VITALS
SYSTOLIC BLOOD PRESSURE: 162 MMHG | OXYGEN SATURATION: 100 % | DIASTOLIC BLOOD PRESSURE: 77 MMHG | HEART RATE: 73 BPM | TEMPERATURE: 98 F | RESPIRATION RATE: 18 BRPM

## 2017-11-16 LAB
BASE EXCESS BLDV CALC-SCNC: 1.9 MMOL/L — SIGNIFICANT CHANGE UP
BLOOD GAS VENOUS - CREATININE: 0.74 MG/DL — SIGNIFICANT CHANGE UP (ref 0.5–1.3)
CHLORIDE BLDV-SCNC: 107 MMOL/L — SIGNIFICANT CHANGE UP (ref 96–108)
GAS PNL BLDV: 134 MMOL/L — LOW (ref 136–146)
GLUCOSE BLDV-MCNC: 121 — HIGH (ref 70–99)
HCO3 BLDV-SCNC: 25 MMOL/L — SIGNIFICANT CHANGE UP (ref 20–27)
HCT VFR BLDV CALC: 40.1 % — SIGNIFICANT CHANGE UP (ref 34.5–45)
HGB BLDV-MCNC: 13.1 G/DL — SIGNIFICANT CHANGE UP (ref 11.5–15.5)
LACTATE BLDV-MCNC: 2.7 MMOL/L — HIGH (ref 0.5–2)
PCO2 BLDV: 49 MMHG — SIGNIFICANT CHANGE UP (ref 41–51)
PH BLDV: 7.36 PH — SIGNIFICANT CHANGE UP (ref 7.32–7.43)
PO2 BLDV: 43 MMHG — HIGH (ref 35–40)
POTASSIUM BLDV-SCNC: 4.3 MMOL/L — SIGNIFICANT CHANGE UP (ref 3.4–4.5)
SAO2 % BLDV: 74.1 % — SIGNIFICANT CHANGE UP (ref 60–85)

## 2017-11-16 PROCEDURE — 99217: CPT | Mod: GC

## 2017-11-16 RX ORDER — METRONIDAZOLE 500 MG
1 TABLET ORAL
Qty: 21 | Refills: 0 | OUTPATIENT
Start: 2017-11-16 | End: 2017-11-23

## 2017-11-16 RX ORDER — MOXIFLOXACIN HYDROCHLORIDE TABLETS, 400 MG 400 MG/1
1 TABLET, FILM COATED ORAL
Qty: 14 | Refills: 0 | OUTPATIENT
Start: 2017-11-16 | End: 2017-11-23

## 2017-11-16 RX ORDER — BENZOCAINE AND MENTHOL 5; 1 G/100ML; G/100ML
1 LIQUID ORAL ONCE
Qty: 0 | Refills: 0 | Status: COMPLETED | OUTPATIENT
Start: 2017-11-16 | End: 2017-11-16

## 2017-11-16 RX ADMIN — Medication 200 MILLIGRAM(S): at 08:55

## 2017-11-16 RX ADMIN — VALSARTAN 80 MILLIGRAM(S): 80 TABLET ORAL at 08:08

## 2017-11-16 RX ADMIN — Medication 100 MILLIGRAM(S): at 04:23

## 2017-11-16 RX ADMIN — METFORMIN HYDROCHLORIDE 500 MILLIGRAM(S): 850 TABLET ORAL at 08:55

## 2017-11-16 RX ADMIN — BENZOCAINE AND MENTHOL 1 LOZENGE: 5; 1 LIQUID ORAL at 10:40

## 2017-11-16 NOTE — ED CDU PROVIDER SUBSEQUENT DAY NOTE - PROGRESS NOTE DETAILS
At present, pt states not feeling well in general (pt not being more specific); objectively has appeared comfortable in interim.  Lactate repeat 2.7; pt. will be signed out to CDU day PA and MD at 0700 hrs.

## 2017-11-16 NOTE — ED CDU PROVIDER SUBSEQUENT DAY NOTE - MEDICAL DECISION MAKING DETAILS
Abd pain, possible colitis on ct with rising lactate - will give IVF, repeat lacate level right after fluids, serial abd exams, consider ct angio abd sv surgical c/s if continues to rise to ro ischemic dz,

## 2017-11-16 NOTE — ED CDU PROVIDER SUBSEQUENT DAY NOTE - HISTORY
87 yo female with PMH of DM, HTN, hyperlipidemia, presents to the ED c/o abdominal pain x 2 weeks as per ED Provider HPI.  Pt. was evaluated in the ED and had CT performed which showed questionable mild thickening of the sigmoid colon; no bowel perforation was identified.  Pt. was noted to have elevated lactate; was started on cipro/flagyl and sent to CDU for continuation of care.  In interim, pt has been resting comfortably; no issues in interim; lactate being rechecked this morning.

## 2017-11-16 NOTE — ED CDU PROVIDER SUBSEQUENT DAY NOTE - ATTENDING CONTRIBUTION TO CARE
Seen and examined, have discussed plan of care with PA.   I agree with note as stated above, having amended the EMR as needed to reflect the findings. Pt. ambulating to bathroom in a.m., had 5 b.m's overnight, no N/V, no episodes of hypotension or tachycardia, no fever, lactate trending down. Clear lungs, soft abd, mild low abd tenderness, L > R, no geovani/guarding, no CVAT.

## 2017-11-16 NOTE — ED CDU PROVIDER DISPOSITION NOTE - CLINICAL COURSE
87yo F with abd pain worsened x 2d, crampy episodic pain, no N/V, no fever/chills, states sl. dec. po intake, and hx of prev. "colitis." CT showed colitis, pt. started on abx (discussed with PMD) had elev. lactate which was trended and was declining at time of DC. Pt. states feels better after IVF and wishes to go home. Will dc on Cipro/Flagyl.

## 2018-01-24 ENCOUNTER — INPATIENT (INPATIENT)
Facility: HOSPITAL | Age: 83
LOS: 1 days | Discharge: HOME | DRG: 641 | End: 2018-01-26
Attending: INTERNAL MEDICINE | Admitting: INTERNAL MEDICINE
Payer: COMMERCIAL

## 2018-01-24 VITALS
DIASTOLIC BLOOD PRESSURE: 90 MMHG | OXYGEN SATURATION: 100 % | SYSTOLIC BLOOD PRESSURE: 182 MMHG | RESPIRATION RATE: 16 BRPM | HEART RATE: 82 BPM

## 2018-01-24 DIAGNOSIS — E78.00 PURE HYPERCHOLESTEROLEMIA, UNSPECIFIED: ICD-10-CM

## 2018-01-24 DIAGNOSIS — I10 ESSENTIAL (PRIMARY) HYPERTENSION: ICD-10-CM

## 2018-01-24 DIAGNOSIS — E87.1 HYPO-OSMOLALITY AND HYPONATREMIA: ICD-10-CM

## 2018-01-24 DIAGNOSIS — E11.9 TYPE 2 DIABETES MELLITUS WITHOUT COMPLICATIONS: ICD-10-CM

## 2018-01-24 LAB
ALBUMIN SERPL ELPH-MCNC: 4.4 G/DL — SIGNIFICANT CHANGE UP (ref 3.3–5)
ALP SERPL-CCNC: 48 U/L — SIGNIFICANT CHANGE UP (ref 40–120)
ALT FLD-CCNC: 13 U/L RC — SIGNIFICANT CHANGE UP (ref 10–45)
ANION GAP SERPL CALC-SCNC: 16 MMOL/L — SIGNIFICANT CHANGE UP (ref 5–17)
APPEARANCE UR: CLEAR — SIGNIFICANT CHANGE UP
AST SERPL-CCNC: 21 U/L — SIGNIFICANT CHANGE UP (ref 10–40)
BASOPHILS # BLD AUTO: 0 K/UL — SIGNIFICANT CHANGE UP (ref 0–0.2)
BASOPHILS NFR BLD AUTO: 0.5 % — SIGNIFICANT CHANGE UP (ref 0–2)
BILIRUB SERPL-MCNC: 0.4 MG/DL — SIGNIFICANT CHANGE UP (ref 0.2–1.2)
BILIRUB UR-MCNC: NEGATIVE — SIGNIFICANT CHANGE UP
BUN SERPL-MCNC: 6 MG/DL — LOW (ref 7–23)
CALCIUM SERPL-MCNC: 9 MG/DL — SIGNIFICANT CHANGE UP (ref 8.4–10.5)
CHLORIDE SERPL-SCNC: 86 MMOL/L — LOW (ref 96–108)
CK MB BLD-MCNC: 1.6 % — SIGNIFICANT CHANGE UP (ref 0–3.5)
CK MB CFR SERPL CALC: 2.3 NG/ML — SIGNIFICANT CHANGE UP (ref 0–3.8)
CK SERPL-CCNC: 141 U/L — SIGNIFICANT CHANGE UP (ref 25–170)
CO2 SERPL-SCNC: 22 MMOL/L — SIGNIFICANT CHANGE UP (ref 22–31)
COLOR SPEC: COLORLESS — SIGNIFICANT CHANGE UP
CREAT SERPL-MCNC: 0.7 MG/DL — SIGNIFICANT CHANGE UP (ref 0.5–1.3)
DIFF PNL FLD: NEGATIVE — SIGNIFICANT CHANGE UP
EOSINOPHIL # BLD AUTO: 0 K/UL — SIGNIFICANT CHANGE UP (ref 0–0.5)
EOSINOPHIL NFR BLD AUTO: 0.2 % — SIGNIFICANT CHANGE UP (ref 0–6)
GLUCOSE BLDC GLUCOMTR-MCNC: 164 MG/DL — HIGH (ref 70–99)
GLUCOSE SERPL-MCNC: 159 MG/DL — HIGH (ref 70–99)
GLUCOSE UR QL: ABNORMAL
HCT VFR BLD CALC: 36.2 % — SIGNIFICANT CHANGE UP (ref 34.5–45)
HGB BLD-MCNC: 13 G/DL — SIGNIFICANT CHANGE UP (ref 11.5–15.5)
KETONES UR-MCNC: NEGATIVE — SIGNIFICANT CHANGE UP
LEUKOCYTE ESTERASE UR-ACNC: ABNORMAL
LIDOCAIN IGE QN: 97 U/L — HIGH (ref 7–60)
LYMPHOCYTES # BLD AUTO: 3.8 K/UL — HIGH (ref 1–3.3)
LYMPHOCYTES # BLD AUTO: 41.7 % — SIGNIFICANT CHANGE UP (ref 13–44)
MCHC RBC-ENTMCNC: 32.2 PG — SIGNIFICANT CHANGE UP (ref 27–34)
MCHC RBC-ENTMCNC: 36 GM/DL — SIGNIFICANT CHANGE UP (ref 32–36)
MCV RBC AUTO: 89.4 FL — SIGNIFICANT CHANGE UP (ref 80–100)
MONOCYTES # BLD AUTO: 0.7 K/UL — SIGNIFICANT CHANGE UP (ref 0–0.9)
MONOCYTES NFR BLD AUTO: 8.1 % — SIGNIFICANT CHANGE UP (ref 2–14)
NEUTROPHILS # BLD AUTO: 4.5 K/UL — SIGNIFICANT CHANGE UP (ref 1.8–7.4)
NEUTROPHILS NFR BLD AUTO: 49.5 % — SIGNIFICANT CHANGE UP (ref 43–77)
NITRITE UR-MCNC: NEGATIVE — SIGNIFICANT CHANGE UP
PH UR: 6.5 — SIGNIFICANT CHANGE UP (ref 5–8)
PLATELET # BLD AUTO: 217 K/UL — SIGNIFICANT CHANGE UP (ref 150–400)
POTASSIUM SERPL-MCNC: 4.4 MMOL/L — SIGNIFICANT CHANGE UP (ref 3.5–5.3)
POTASSIUM SERPL-SCNC: 4.4 MMOL/L — SIGNIFICANT CHANGE UP (ref 3.5–5.3)
PROT SERPL-MCNC: 7.9 G/DL — SIGNIFICANT CHANGE UP (ref 6–8.3)
PROT UR-MCNC: NEGATIVE — SIGNIFICANT CHANGE UP
RAPID RVP RESULT: SIGNIFICANT CHANGE UP
RBC # BLD: 4.05 M/UL — SIGNIFICANT CHANGE UP (ref 3.8–5.2)
RBC # FLD: 11.2 % — SIGNIFICANT CHANGE UP (ref 10.3–14.5)
SODIUM SERPL-SCNC: 124 MMOL/L — LOW (ref 135–145)
SP GR SPEC: 1 — LOW (ref 1.01–1.02)
TROPONIN T SERPL-MCNC: <0.01 NG/ML — SIGNIFICANT CHANGE UP (ref 0–0.06)
UROBILINOGEN FLD QL: NEGATIVE — SIGNIFICANT CHANGE UP
WBC # BLD: 9.1 K/UL — SIGNIFICANT CHANGE UP (ref 3.8–10.5)
WBC # FLD AUTO: 9.1 K/UL — SIGNIFICANT CHANGE UP (ref 3.8–10.5)

## 2018-01-24 PROCEDURE — 71045 X-RAY EXAM CHEST 1 VIEW: CPT | Mod: 26

## 2018-01-24 PROCEDURE — 74177 CT ABD & PELVIS W/CONTRAST: CPT | Mod: 26

## 2018-01-24 PROCEDURE — 93010 ELECTROCARDIOGRAM REPORT: CPT

## 2018-01-24 PROCEDURE — 99285 EMERGENCY DEPT VISIT HI MDM: CPT | Mod: 25

## 2018-01-24 RX ORDER — ACETAMINOPHEN 500 MG
1000 TABLET ORAL ONCE
Qty: 0 | Refills: 0 | Status: COMPLETED | OUTPATIENT
Start: 2018-01-24 | End: 2018-01-24

## 2018-01-24 RX ORDER — ASCORBIC ACID 60 MG
0 TABLET,CHEWABLE ORAL
Qty: 0 | Refills: 0 | COMMUNITY

## 2018-01-24 RX ORDER — METFORMIN HYDROCHLORIDE 850 MG/1
1 TABLET ORAL
Qty: 0 | Refills: 0 | COMMUNITY

## 2018-01-24 RX ORDER — SODIUM CHLORIDE 9 MG/ML
1000 INJECTION INTRAMUSCULAR; INTRAVENOUS; SUBCUTANEOUS
Qty: 0 | Refills: 0 | Status: DISCONTINUED | OUTPATIENT
Start: 2018-01-24 | End: 2018-01-26

## 2018-01-24 RX ORDER — GLUCAGON INJECTION, SOLUTION 0.5 MG/.1ML
1 INJECTION, SOLUTION SUBCUTANEOUS ONCE
Qty: 0 | Refills: 0 | Status: DISCONTINUED | OUTPATIENT
Start: 2018-01-24 | End: 2018-01-26

## 2018-01-24 RX ORDER — INSULIN GLARGINE 100 [IU]/ML
12 INJECTION, SOLUTION SUBCUTANEOUS AT BEDTIME
Qty: 0 | Refills: 0 | Status: DISCONTINUED | OUTPATIENT
Start: 2018-01-24 | End: 2018-01-26

## 2018-01-24 RX ORDER — SODIUM CHLORIDE 9 MG/ML
1000 INJECTION, SOLUTION INTRAVENOUS
Qty: 0 | Refills: 0 | Status: DISCONTINUED | OUTPATIENT
Start: 2018-01-24 | End: 2018-01-26

## 2018-01-24 RX ORDER — DEXTROSE 50 % IN WATER 50 %
1 SYRINGE (ML) INTRAVENOUS ONCE
Qty: 0 | Refills: 0 | Status: DISCONTINUED | OUTPATIENT
Start: 2018-01-24 | End: 2018-01-26

## 2018-01-24 RX ORDER — VALSARTAN 80 MG/1
160 TABLET ORAL DAILY
Qty: 0 | Refills: 0 | Status: DISCONTINUED | OUTPATIENT
Start: 2018-01-24 | End: 2018-01-26

## 2018-01-24 RX ORDER — OMEGA-3 ACID ETHYL ESTERS 1 G
0 CAPSULE ORAL
Qty: 0 | Refills: 0 | COMMUNITY

## 2018-01-24 RX ORDER — VALSARTAN 80 MG/1
1 TABLET ORAL
Qty: 0 | Refills: 0 | COMMUNITY

## 2018-01-24 RX ORDER — SODIUM CHLORIDE 9 MG/ML
500 INJECTION INTRAMUSCULAR; INTRAVENOUS; SUBCUTANEOUS ONCE
Qty: 0 | Refills: 0 | Status: COMPLETED | OUTPATIENT
Start: 2018-01-24 | End: 2018-01-24

## 2018-01-24 RX ORDER — INSULIN LISPRO 100/ML
10 VIAL (ML) SUBCUTANEOUS
Qty: 0 | Refills: 0 | COMMUNITY

## 2018-01-24 RX ORDER — ERGOCALCIFEROL 1.25 MG/1
1 CAPSULE ORAL
Qty: 0 | Refills: 0 | COMMUNITY

## 2018-01-24 RX ORDER — INSULIN LISPRO 100/ML
VIAL (ML) SUBCUTANEOUS
Qty: 0 | Refills: 0 | Status: DISCONTINUED | OUTPATIENT
Start: 2018-01-24 | End: 2018-01-26

## 2018-01-24 RX ORDER — DEXTROSE 50 % IN WATER 50 %
12.5 SYRINGE (ML) INTRAVENOUS ONCE
Qty: 0 | Refills: 0 | Status: DISCONTINUED | OUTPATIENT
Start: 2018-01-24 | End: 2018-01-26

## 2018-01-24 RX ORDER — SIMVASTATIN 20 MG/1
40 TABLET, FILM COATED ORAL AT BEDTIME
Qty: 0 | Refills: 0 | Status: DISCONTINUED | OUTPATIENT
Start: 2018-01-24 | End: 2018-01-26

## 2018-01-24 RX ORDER — FOLIC ACID 0.8 MG
1 TABLET ORAL DAILY
Qty: 0 | Refills: 0 | Status: DISCONTINUED | OUTPATIENT
Start: 2018-01-24 | End: 2018-01-26

## 2018-01-24 RX ORDER — GABAPENTIN 400 MG/1
1 CAPSULE ORAL
Qty: 0 | Refills: 0 | COMMUNITY

## 2018-01-24 RX ADMIN — INSULIN GLARGINE 12 UNIT(S): 100 INJECTION, SOLUTION SUBCUTANEOUS at 21:20

## 2018-01-24 RX ADMIN — Medication 1000 MILLIGRAM(S): at 13:52

## 2018-01-24 RX ADMIN — SODIUM CHLORIDE 60 MILLILITER(S): 9 INJECTION INTRAMUSCULAR; INTRAVENOUS; SUBCUTANEOUS at 22:23

## 2018-01-24 RX ADMIN — SIMVASTATIN 40 MILLIGRAM(S): 20 TABLET, FILM COATED ORAL at 21:20

## 2018-01-24 RX ADMIN — Medication 400 MILLIGRAM(S): at 13:12

## 2018-01-24 RX ADMIN — SODIUM CHLORIDE 250 MILLILITER(S): 9 INJECTION INTRAMUSCULAR; INTRAVENOUS; SUBCUTANEOUS at 13:13

## 2018-01-24 NOTE — H&P ADULT - HISTORY OF PRESENT ILLNESS
86F hx of HTN, DM on glipizide, HLD, colitis, cholecystectomy c/o of abdominal pain associated with loose brown stools (up to 2x/day), nausea without emesis, dyspnea on exertion, elevated blood pressures and headache without vision changes x last few days (cannot quantify time). + dry cough. No fevers/ chills/ chest pain/ palpitations/  complaints/ hematochezia/melena. Has had adverse reaction to Naproxen 250mg bid recently (nausea). Endorses poor PO intake and fatigue/ generalized weakness/ muscle aches  Labs indicate hyponatremia, admitted fro dehydration

## 2018-01-24 NOTE — ED PROVIDER NOTE - OBJECTIVE STATEMENT
86F hx of HTN, DM on glipizide, HLD, colitis, cholecystectomy c/o of abdominal pain associated with loose brown stools (up to 2x/day), nausea without emesis, dyspnea on exertion, elevated blood pressures and headache without vision changes x last few days (cannot quantify time). + dry cough. No fevers/ chills/ chest pain/ palpitations/  complaints/ hematochezia/melena. Has had adverse reaction to Naproxen 250mg bid recently (nausea). Endorses poor PO intake and fatigue/ generalized weakness/ muscle aches    PCP: Dr Maricarmen Mohamud 86F hx of HTN, DM on glipizide, HLD, colitis, cholecystectomy c/o of abdominal pain associated with loose brown stools (up to 2x/day), nausea without emesis, dyspnea on exertion, elevated blood pressures and headache without vision changes x last few days (cannot quantify time). + dry cough. No fevers/ chills/ chest pain/ palpitations/  complaints/ hematochezia/melena. Has had adverse reaction to Naproxen 250mg bid recently (nausea). Endorses poor PO intake and fatigue/ generalized weakness/ muscle aches    PCP: Dr Maricarmen Mohamud  Current meds: naproxen, amlodipine, valsartan, dicyclomine, glipizide ER 10mg, pravastatin, folic acid

## 2018-01-24 NOTE — ED PROVIDER NOTE - CHIEF COMPLAINT
The patient is a 86y Female complaining of The patient is a 86y Female complaining of headache and abdominal pain

## 2018-01-24 NOTE — ED PROVIDER NOTE - ATTENDING CONTRIBUTION TO CARE
Attending MD Hutchins:  I personally have seen and examined this patient.  Resident note reviewed and agree on plan of care and except where noted.  See HPI, PE, and MDM for details.

## 2018-01-24 NOTE — ED PROVIDER NOTE - MEDICAL DECISION MAKING DETAILS
Attending MD Hutchins: 86F with 2 weeks of abdominal pain, also with mild headache and fatigue. Exam notable for mild abd ttp diffusely, ddx includes colitis, constipation. less likely SBO. Will obtain CT a/p to eval. Consider atypical ACS as well, will send cardiac biomarkers, overall low suspicion though. EKG with completion of LBBB. Attending MD Hutchins: 86F with 2 weeks of abdominal pain, also with mild headache and fatigue. Exam notable for mild abd ttp diffusely, ddx includes colitis, constipation. less likely SBO. Will obtain CT a/p to eval. Consider atypical ACS as well, will send cardiac biomarkers, overall low suspicion though. EKG with completion of LBBB.    Courtney PGY1: 86F DM, HTN, c/o nausea, headache, and abdominal pain + weakness. Likely dehydrated from diarrhea, assess for infectious etiology, cxr, UA, CT a/p to assess for colits vs appendicitis (less likely given mildness of pain), rule out atypical ACS though less likely as not primarily cardiac complaints

## 2018-01-24 NOTE — ED PROCEDURE NOTE - PROCEDURE ADDITIONAL DETAILS
POCUS of kidneys and lungs were performed. Kidneys do not appear to have hydronephrosis and appear of normal size. Urinary bladder full with about 390 mL of urine. Lungs with predominant A line pattern and appropriate lung sliding. No pleural effusions noted. CXR and CT abdomen to follow

## 2018-01-24 NOTE — ED PROVIDER NOTE - PHYSICAL EXAMINATION
Attending MD Hutchins: A & O x 3, NAD, EOMI b/l, PERRL b/l; lungs CTAB, heart with reg rhythm without murmur; abdomen soft, mild distention, mild diffuse ttp no rebound or guarding,; extremities with no edema; affect appropriate. neuro exam non focal with no motor or sensory deficits. Attending MD Hutchins: A & O x 3, NAD, EOMI b/l, PERRL b/l; lungs CTAB, heart with reg rhythm without murmur; abdomen soft, mild distention, mild diffuse ttp no rebound or guarding,; extremities with no edema; affect appropriate. neuro exam non focal with no motor or sensory deficits.  Courtney PGY1: AAOx3, NAD  Head: NCAT  ENT: Airway patent, dry mucous membranes, nasal passageways clear, no JVD, no cervical lymphadenopathy, EOMI  Cardiac: Normal rate, normal rhythm, no murmurs appreciated  Respiratory: Lungs CTA B/L  Gastrointestinal: +BS, Abdomen soft, mild TTP RLQ , nondistended, no rebound, no guarding, no organomegaly   MSK: No gross abnormalities, FROM of all four extremities, no edema  HEME: Extremities warm, pulses intact and symmetrical in all four extremities  Skin: No rashes, no lesions  Neuro: No gross neurologic deficits, CN II-XII intact, no facial asymmetry, strength equal in all four extremities

## 2018-01-24 NOTE — ED ADULT NURSE NOTE - OBJECTIVE STATEMENT
Patient presents to Ed with c/o Abd pain and high blood pressure. Pt reports experiencing Abd pain  for two days and her stools are loose and her bp is high. Pt A&)x3 denies chest pain or sob, +nausea  no vomiting fever or chills +headache and dry cough. Abd soft with diffuse tenderness, skin warm and  dry. RFA 20g ivl placed labs drawn and sent.

## 2018-01-25 LAB
ANION GAP SERPL CALC-SCNC: 11 MMOL/L — SIGNIFICANT CHANGE UP (ref 5–17)
ANION GAP SERPL CALC-SCNC: 14 MMOL/L — SIGNIFICANT CHANGE UP (ref 5–17)
BUN SERPL-MCNC: 10 MG/DL — SIGNIFICANT CHANGE UP (ref 7–23)
BUN SERPL-MCNC: 10 MG/DL — SIGNIFICANT CHANGE UP (ref 7–23)
CALCIUM SERPL-MCNC: 8.9 MG/DL — SIGNIFICANT CHANGE UP (ref 8.4–10.5)
CALCIUM SERPL-MCNC: 9.2 MG/DL — SIGNIFICANT CHANGE UP (ref 8.4–10.5)
CHLORIDE SERPL-SCNC: 90 MMOL/L — LOW (ref 96–108)
CHLORIDE SERPL-SCNC: 95 MMOL/L — LOW (ref 96–108)
CHLORIDE UR-SCNC: 59 MMOL/L — SIGNIFICANT CHANGE UP
CO2 SERPL-SCNC: 24 MMOL/L — SIGNIFICANT CHANGE UP (ref 22–31)
CO2 SERPL-SCNC: 26 MMOL/L — SIGNIFICANT CHANGE UP (ref 22–31)
CREAT ?TM UR-MCNC: 27 MG/DL — SIGNIFICANT CHANGE UP
CREAT SERPL-MCNC: 0.69 MG/DL — SIGNIFICANT CHANGE UP (ref 0.5–1.3)
CREAT SERPL-MCNC: 1.05 MG/DL — SIGNIFICANT CHANGE UP (ref 0.5–1.3)
GLUCOSE BLDC GLUCOMTR-MCNC: 149 MG/DL — HIGH (ref 70–99)
GLUCOSE BLDC GLUCOMTR-MCNC: 149 MG/DL — HIGH (ref 70–99)
GLUCOSE BLDC GLUCOMTR-MCNC: 152 MG/DL — HIGH (ref 70–99)
GLUCOSE BLDC GLUCOMTR-MCNC: 183 MG/DL — HIGH (ref 70–99)
GLUCOSE BLDC GLUCOMTR-MCNC: 272 MG/DL — HIGH (ref 70–99)
GLUCOSE SERPL-MCNC: 102 MG/DL — HIGH (ref 70–99)
GLUCOSE SERPL-MCNC: 235 MG/DL — HIGH (ref 70–99)
HCT VFR BLD CALC: 38.6 % — SIGNIFICANT CHANGE UP (ref 34.5–45)
HGB BLD-MCNC: 13.5 G/DL — SIGNIFICANT CHANGE UP (ref 11.5–15.5)
MCHC RBC-ENTMCNC: 31 PG — SIGNIFICANT CHANGE UP (ref 27–34)
MCHC RBC-ENTMCNC: 35 GM/DL — SIGNIFICANT CHANGE UP (ref 32–36)
MCV RBC AUTO: 88.5 FL — SIGNIFICANT CHANGE UP (ref 80–100)
OSMOLALITY UR: 185 MOS/KG — SIGNIFICANT CHANGE UP (ref 50–1200)
OSMOLALITY UR: 209 MOS/KG — SIGNIFICANT CHANGE UP (ref 50–1200)
PLATELET # BLD AUTO: 229 K/UL — SIGNIFICANT CHANGE UP (ref 150–400)
POTASSIUM SERPL-MCNC: 4.7 MMOL/L — SIGNIFICANT CHANGE UP (ref 3.5–5.3)
POTASSIUM SERPL-MCNC: 4.9 MMOL/L — SIGNIFICANT CHANGE UP (ref 3.5–5.3)
POTASSIUM SERPL-SCNC: 4.7 MMOL/L — SIGNIFICANT CHANGE UP (ref 3.5–5.3)
POTASSIUM SERPL-SCNC: 4.9 MMOL/L — SIGNIFICANT CHANGE UP (ref 3.5–5.3)
POTASSIUM UR-SCNC: 19 MMOL/L — SIGNIFICANT CHANGE UP
RBC # BLD: 4.36 M/UL — SIGNIFICANT CHANGE UP (ref 3.8–5.2)
RBC # FLD: 12.6 % — SIGNIFICANT CHANGE UP (ref 10.3–14.5)
SODIUM SERPL-SCNC: 128 MMOL/L — LOW (ref 135–145)
SODIUM SERPL-SCNC: 132 MMOL/L — LOW (ref 135–145)
SODIUM UR-SCNC: 33 MMOL/L — SIGNIFICANT CHANGE UP
SODIUM UR-SCNC: 62 MMOL/L — SIGNIFICANT CHANGE UP
UUN UR-MCNC: 133 MG/DL — SIGNIFICANT CHANGE UP
WBC # BLD: 6.43 K/UL — SIGNIFICANT CHANGE UP (ref 3.8–10.5)
WBC # FLD AUTO: 6.43 K/UL — SIGNIFICANT CHANGE UP (ref 3.8–10.5)

## 2018-01-25 RX ORDER — HEPARIN SODIUM 5000 [USP'U]/ML
5000 INJECTION INTRAVENOUS; SUBCUTANEOUS EVERY 8 HOURS
Qty: 0 | Refills: 0 | Status: DISCONTINUED | OUTPATIENT
Start: 2018-01-25 | End: 2018-01-26

## 2018-01-25 RX ORDER — METOPROLOL TARTRATE 50 MG
50 TABLET ORAL DAILY
Qty: 0 | Refills: 0 | Status: DISCONTINUED | OUTPATIENT
Start: 2018-01-25 | End: 2018-01-26

## 2018-01-25 RX ADMIN — SODIUM CHLORIDE 60 MILLILITER(S): 9 INJECTION INTRAMUSCULAR; INTRAVENOUS; SUBCUTANEOUS at 18:22

## 2018-01-25 RX ADMIN — Medication 2: at 13:25

## 2018-01-25 RX ADMIN — VALSARTAN 160 MILLIGRAM(S): 80 TABLET ORAL at 05:57

## 2018-01-25 RX ADMIN — Medication 50 MILLIGRAM(S): at 15:57

## 2018-01-25 RX ADMIN — INSULIN GLARGINE 12 UNIT(S): 100 INJECTION, SOLUTION SUBCUTANEOUS at 22:41

## 2018-01-25 RX ADMIN — SIMVASTATIN 40 MILLIGRAM(S): 20 TABLET, FILM COATED ORAL at 22:37

## 2018-01-25 RX ADMIN — HEPARIN SODIUM 5000 UNIT(S): 5000 INJECTION INTRAVENOUS; SUBCUTANEOUS at 22:37

## 2018-01-25 RX ADMIN — Medication 6: at 09:24

## 2018-01-25 RX ADMIN — Medication 1 MILLIGRAM(S): at 12:16

## 2018-01-25 NOTE — PROVIDER CONTACT NOTE (OTHER) - ACTION/TREATMENT ORDERED:
Provider made aware. no further action at this time. Reevaluate BP in a hour. PT safety maintain. Will continue to monitor.

## 2018-01-25 NOTE — CONSULT NOTE ADULT - PROBLEM SELECTOR RECOMMENDATION 9
likely hypovolemic   agree with ivf- IV NACL @ 60/cchr  trend na q12 for now  place pt on free h20 restriction of 1000cc  check urine na/cr/cl/OSM/  nausea control  avoid nsaids as they can worsen hyponatremia  currently on valsartan which also can worsen hyponatremia, but given bp is elevated will watch on valsartan for now

## 2018-01-25 NOTE — PROGRESS NOTE ADULT - SUBJECTIVE AND OBJECTIVE BOX
Patient is a 86y old  Female who presents with a chief complaint of Abd pain, hyponatremia (2018 20:00)      SUBJECTIVE / OVERNIGHT EVENTS:  Afebrile  No GI symptoms  mild dizziness  Review of Systems:   CONSTITUTIONAL: No fever, weight loss, or fatigue  EYES: No eye pain, visual disturbances, or discharge  ENMT:  No difficulty hearing, tinnitus, vertigo; No sinus or throat pain  NECK: No pain or stiffness  BREASTS: No pain, masses, or nipple discharge  RESPIRATORY: No cough, wheezing, chills or hemoptysis; No shortness of breath  CARDIOVASCULAR: No chest pain, palpitations, dizziness, or leg swelling  GASTROINTESTINAL: No abdominal or epigastric pain. No nausea, vomiting, or hematemesis; No diarrhea or constipation. No melena or hematochezia.  GENITOURINARY: No dysuria, frequency, hematuria, or incontinence  NEUROLOGICAL: No headaches, memory loss, loss of strength, numbness, or tremors  SKIN: No itching, burning, rashes, or lesions   LYMPH NODES: No enlarged glands  ENDOCRINE: No heat or cold intolerance; No hair loss  MUSCULOSKELETAL: No joint pain or swelling; No muscle, back, or extremity pain  PSYCHIATRIC: No depression, anxiety, mood swings, or difficulty sleeping  HEME/LYMPH: No easy bruising, or bleeding gums  ALLERY AND IMMUNOLOGIC: No hives or eczema    MEDICATIONS  (STANDING):  dextrose 5%. 1000 milliLiter(s) (50 mL/Hr) IV Continuous <Continuous>  dextrose 50% Injectable 12.5 Gram(s) IV Push once  folic acid 1 milliGRAM(s) Oral daily  heparin  Injectable 5000 Unit(s) SubCutaneous every 8 hours  insulin glargine Injectable (LANTUS) 12 Unit(s) SubCutaneous at bedtime  insulin lispro (HumaLOG) corrective regimen sliding scale   SubCutaneous three times a day before meals  simvastatin 40 milliGRAM(s) Oral at bedtime  sodium chloride 0.9%. 1000 milliLiter(s) (60 mL/Hr) IV Continuous <Continuous>  valsartan 160 milliGRAM(s) Oral daily    MEDICATIONS  (PRN):  dextrose Gel 1 Dose(s) Oral once PRN Blood Glucose LESS THAN 70 milliGRAM(s)/deciliter  glucagon  Injectable 1 milliGRAM(s) IntraMuscular once PRN Glucose LESS THAN 70 milligrams/deciliter      PHYSICAL EXAM:  Vital Signs Last 24 Hrs  T(C): 36.3 (2018 07:43), Max: 37.8 (2018 14:38)  T(F): 97.3 (2018 07:43), Max: 100 (2018 14:38)  HR: 70 (2018 07:43) (64 - 92)  BP: 149/70 (2018 07:43) (142/75 - 170/84)  BP(mean): --  RR: 18 (2018 07:43) (17 - 18)  SpO2: 99% (2018 07:43) (99% - 100%)  I&O's Summary    GENERAL: NAD, well-developed  HEAD:  Atraumatic, Normocephalic  EYES: EOMI, PERRLA, conjunctiva and sclera clear  NECK: Supple, No JVD  CHEST/LUNG: Clear to auscultation bilaterally; No wheeze  HEART: Regular rate and rhythm; No murmurs, rubs, or gallops  ABDOMEN: Soft, Nontender, Nondistended; Bowel sounds present  EXTREMITIES:  2+ Peripheral Pulses, No clubbing, cyanosis, or edema  PSYCH: AAOx3  NEUROLOGY: non-focal  SKIN: No rashes or lesions    LABS:  CAPILLARY BLOOD GLUCOSE      POCT Blood Glucose.: 272 mg/dL (2018 08:34)  POCT Blood Glucose.: 164 mg/dL (2018 20:47)                          13.5   6.43  )-----------( 229      ( 2018 07:41 )             38.6     01-25    128<L>  |  90<L>  |  10  ----------------------------<  235<H>  4.9   |  24  |  1.05    Ca    8.9      2018 07:43    TPro  7.9  /  Alb  4.4  /  TBili  0.4  /  DBili  x   /  AST  21  /  ALT  13  /  AlkPhos  48  01-24      CARDIAC MARKERS ( 2018 11:34 )  x     / <0.01 ng/mL / 141 U/L / x     / 2.3 ng/mL      Urinalysis Basic - ( 2018 11:34 )    Color: Colorless / Appearance: Clear / S.005 / pH: x  Gluc: x / Ketone: Negative  / Bili: Negative / Urobili: Negative   Blood: x / Protein: Negative / Nitrite: Negative   Leuk Esterase: Trace / RBC: x / WBC 2-5 /HPF   Sq Epi: x / Non Sq Epi: Occasional /HPF / Bacteria: x        RADIOLOGY & ADDITIONAL TESTS:    Imaging Personally Reviewed:    Consultant(s) Notes Reviewed:      Care Discussed with Consultants/Other Providers:

## 2018-01-25 NOTE — CONSULT NOTE ADULT - ASSESSMENT
86F hx of HTN, DM on glipizide, HLD, colitis, cholecystectomy c/o of abdominal pain associated with loose brown stools (up to 2x/day), nausea without emesis, dyspnea on exertion, elevated blood pressures and headache found to have hyponatremia

## 2018-01-25 NOTE — CONSULT NOTE ADULT - SUBJECTIVE AND OBJECTIVE BOX
[facility] Consult Note Nephrology - CONSULTATION NOTE    86F hx of HTN, DM on glipizide, HLD, colitis, cholecystectomy c/o of abdominal pain associated with loose brown stools (up to 2x/day), nausea without emesis, dyspnea on exertion, elevated blood pressures and headache without vision changes x last few days (cannot quantify time). + dry cough. No fevers/ chills/ chest pain/ palpitations/  complaints/ hematochezia/melena. Has had adverse reaction to Naproxen 250mg bid recently (nausea). Endorses poor PO intake and fatigue/ generalized weakness/ muscle aches  Labs indicate hyponatremia, admitted for dehydration. Initialy na was 124 now improve to 128 while being on IV NACL @60/cchr    PAST MEDICAL & SURGICAL HISTORY:  HTN - Hypertension  Hypercholesterolemia  Diabetes mellitus  S/P umbilical hernia repair, follow-up exam  S/P cholecystectomy    naproxen (Nausea)    Home Medications Reviewed  Hospital Medications:   MEDICATIONS  (STANDING):  dextrose 5%. 1000 milliLiter(s) (50 mL/Hr) IV Continuous <Continuous>  dextrose 50% Injectable 12.5 Gram(s) IV Push once  folic acid 1 milliGRAM(s) Oral daily  heparin  Injectable 5000 Unit(s) SubCutaneous every 8 hours  insulin glargine Injectable (LANTUS) 12 Unit(s) SubCutaneous at bedtime  insulin lispro (HumaLOG) corrective regimen sliding scale   SubCutaneous three times a day before meals  simvastatin 40 milliGRAM(s) Oral at bedtime  sodium chloride 0.9%. 1000 milliLiter(s) (60 mL/Hr) IV Continuous <Continuous>  valsartan 160 milliGRAM(s) Oral daily    SOCIAL HISTORY:  Denies ETOh,Smoking,   FAMILY HISTORY:  No pertinent family history in first degree relatives    REVIEW OF SYSTEMS:  CONSTITUTIONAL: No weakness, fevers or chills  EYES/ENT: No visual changes;  No vertigo or throat pain   NECK: No pain or stiffness  RESPIRATORY: No cough, wheezing, hemoptysis; No shortness of breath  CARDIOVASCULAR: No chest pain or palpitations.  GASTROINTESTINAL: No abdominal or epigastric pain. No nausea, vomiting, or hematemesis; No diarrhea or constipation. No melena or hematochezia.  GENITOURINARY: No dysuria, frequency, foamy urine, urinary urgency, incontinence or hematuria  NEUROLOGICAL: No numbness or weakness  SKIN: No itching, burning, rashes, or lesions   VASCULAR: No bilateral lower extremity edema.   All other review of systems is negative unless indicated above.    VITALS:  T(F): 97.3 (18 @ 07:43), Max: 100 (18 @ 14:38)  HR: 70 (18 @ 07:43)  BP: 149/70 (18 @ 07:43)  RR: 18 (18 @ 07:43)  SpO2: 99% (18 @ 07:43)  Wt(kg): --      PHYSICAL EXAM:  Constitutional: NAD  HEENT: anicteric sclera, oropharynx clear, MMM  Neck: No JVD  Respiratory: CTAB, no wheezes, rales or rhonchi  Cardiovascular: S1, S2, RRR  Gastrointestinal: BS+, soft, NT/ND  Extremities: No cyanosis or clubbing. No peripheral edema  Neurological: A/O x 3, no focal deficits  Psychiatric: Normal mood, normal affect      LABS:      128<L>  |  90<L>  |  10  ----------------------------<  235<H>  4.9   |  24  |  1.05    Ca    8.9      2018 07:43    TPro  7.9  /  Alb  4.4  /  TBili  0.4  /  DBili      /  AST  21  /  ALT  13  /  AlkPhos  48      Creatinine Trend: 1.05 <--, 0.70 <--                        13.5   6.43  )-----------( 229      ( 2018 07:41 )             38.6     Urine Studies:  Urinalysis Basic - ( 2018 11:34 )    Color: Colorless / Appearance: Clear / S.005 / pH:   Gluc:  / Ketone: Negative  / Bili: Negative / Urobili: Negative   Blood:  / Protein: Negative / Nitrite: Negative   Leuk Esterase: Trace / RBC:  / WBC 2-5 /HPF   Sq Epi:  / Non Sq Epi: Occasional /HPF / Bacteria:       Creatinine, Random Urine: 27 mg/dL ( @ 08:10)  Sodium, Random Urine: 33 mmol/L ( @ 08:10)    RADIOLOGY & ADDITIONAL STUDIES:

## 2018-01-26 ENCOUNTER — TRANSCRIPTION ENCOUNTER (OUTPATIENT)
Age: 83
End: 2018-01-26

## 2018-01-26 VITALS
DIASTOLIC BLOOD PRESSURE: 85 MMHG | SYSTOLIC BLOOD PRESSURE: 184 MMHG | HEART RATE: 60 BPM | OXYGEN SATURATION: 100 % | TEMPERATURE: 98 F | RESPIRATION RATE: 16 BRPM

## 2018-01-26 LAB
ANION GAP SERPL CALC-SCNC: 12 MMOL/L — SIGNIFICANT CHANGE UP (ref 5–17)
BUN SERPL-MCNC: 9 MG/DL — SIGNIFICANT CHANGE UP (ref 7–23)
CALCIUM SERPL-MCNC: 9.1 MG/DL — SIGNIFICANT CHANGE UP (ref 8.4–10.5)
CHLORIDE SERPL-SCNC: 95 MMOL/L — LOW (ref 96–108)
CO2 SERPL-SCNC: 25 MMOL/L — SIGNIFICANT CHANGE UP (ref 22–31)
CREAT SERPL-MCNC: 0.79 MG/DL — SIGNIFICANT CHANGE UP (ref 0.5–1.3)
GLUCOSE BLDC GLUCOMTR-MCNC: 122 MG/DL — HIGH (ref 70–99)
GLUCOSE BLDC GLUCOMTR-MCNC: 154 MG/DL — HIGH (ref 70–99)
GLUCOSE SERPL-MCNC: 227 MG/DL — HIGH (ref 70–99)
POTASSIUM SERPL-MCNC: 4.7 MMOL/L — SIGNIFICANT CHANGE UP (ref 3.5–5.3)
POTASSIUM SERPL-SCNC: 4.7 MMOL/L — SIGNIFICANT CHANGE UP (ref 3.5–5.3)
SODIUM SERPL-SCNC: 132 MMOL/L — LOW (ref 135–145)

## 2018-01-26 PROCEDURE — 96374 THER/PROPH/DIAG INJ IV PUSH: CPT | Mod: XU

## 2018-01-26 PROCEDURE — 80048 BASIC METABOLIC PNL TOTAL CA: CPT

## 2018-01-26 PROCEDURE — 80053 COMPREHEN METABOLIC PANEL: CPT

## 2018-01-26 PROCEDURE — 87581 M.PNEUMON DNA AMP PROBE: CPT

## 2018-01-26 PROCEDURE — 71045 X-RAY EXAM CHEST 1 VIEW: CPT

## 2018-01-26 PROCEDURE — 84540 ASSAY OF URINE/UREA-N: CPT

## 2018-01-26 PROCEDURE — 82553 CREATINE MB FRACTION: CPT

## 2018-01-26 PROCEDURE — 87486 CHLMYD PNEUM DNA AMP PROBE: CPT

## 2018-01-26 PROCEDURE — 81001 URINALYSIS AUTO W/SCOPE: CPT

## 2018-01-26 PROCEDURE — 93005 ELECTROCARDIOGRAM TRACING: CPT

## 2018-01-26 PROCEDURE — 82436 ASSAY OF URINE CHLORIDE: CPT

## 2018-01-26 PROCEDURE — 84300 ASSAY OF URINE SODIUM: CPT

## 2018-01-26 PROCEDURE — 99285 EMERGENCY DEPT VISIT HI MDM: CPT | Mod: 25

## 2018-01-26 PROCEDURE — 82962 GLUCOSE BLOOD TEST: CPT

## 2018-01-26 PROCEDURE — 83690 ASSAY OF LIPASE: CPT

## 2018-01-26 PROCEDURE — 87633 RESP VIRUS 12-25 TARGETS: CPT

## 2018-01-26 PROCEDURE — 84133 ASSAY OF URINE POTASSIUM: CPT

## 2018-01-26 PROCEDURE — 84484 ASSAY OF TROPONIN QUANT: CPT

## 2018-01-26 PROCEDURE — 82550 ASSAY OF CK (CPK): CPT

## 2018-01-26 PROCEDURE — 85027 COMPLETE CBC AUTOMATED: CPT

## 2018-01-26 PROCEDURE — 87798 DETECT AGENT NOS DNA AMP: CPT

## 2018-01-26 PROCEDURE — 83935 ASSAY OF URINE OSMOLALITY: CPT

## 2018-01-26 PROCEDURE — 74177 CT ABD & PELVIS W/CONTRAST: CPT

## 2018-01-26 PROCEDURE — 82570 ASSAY OF URINE CREATININE: CPT

## 2018-01-26 RX ORDER — METOPROLOL TARTRATE 50 MG
1 TABLET ORAL
Qty: 30 | Refills: 0 | OUTPATIENT
Start: 2018-01-26 | End: 2018-02-24

## 2018-01-26 RX ORDER — AMLODIPINE BESYLATE 2.5 MG/1
5 TABLET ORAL DAILY
Qty: 0 | Refills: 0 | Status: DISCONTINUED | OUTPATIENT
Start: 2018-01-26 | End: 2018-01-26

## 2018-01-26 RX ORDER — AMLODIPINE BESYLATE 2.5 MG/1
1 TABLET ORAL
Qty: 0 | Refills: 0 | COMMUNITY
Start: 2018-01-26

## 2018-01-26 RX ORDER — VALSARTAN 80 MG/1
1 TABLET ORAL
Qty: 0 | Refills: 0 | COMMUNITY

## 2018-01-26 RX ADMIN — Medication 1 MILLIGRAM(S): at 12:57

## 2018-01-26 RX ADMIN — Medication 50 MILLIGRAM(S): at 07:07

## 2018-01-26 RX ADMIN — AMLODIPINE BESYLATE 5 MILLIGRAM(S): 2.5 TABLET ORAL at 12:57

## 2018-01-26 RX ADMIN — HEPARIN SODIUM 5000 UNIT(S): 5000 INJECTION INTRAVENOUS; SUBCUTANEOUS at 07:09

## 2018-01-26 RX ADMIN — Medication 2: at 09:39

## 2018-01-26 RX ADMIN — VALSARTAN 160 MILLIGRAM(S): 80 TABLET ORAL at 07:07

## 2018-01-26 NOTE — DISCHARGE NOTE ADULT - CARE PLAN
Principal Discharge DX:	Hyponatremia  Goal:	maintain stabilization of your blood levels  Assessment and plan of treatment:	Follow up in 1 week and call office at [phone] immediately upon to schedule and make an appointment.    Many medicines can make hyponatremia worse. Discuss all your medicines with your caregiver.  Carefully follow any recommended diet, including any fluid restrictions.  You may be asked to repeat lab tests. Follow these directions.  Avoid alcohol and recreational drugs.  SEEK MEDICAL CARE IF:  You develop worsening nausea, fatigue, headache, confusion, or weakness.  Your original hyponatremia symptoms return.  You have problems following the recommended diet.   SEEK IMMEDIATE MEDICAL CARE IF:  You have a seizure.  You faint.  You have ongoing diarrhea or vomiting  Secondary Diagnosis:	Pain of upper abdomen  Assessment and plan of treatment:	Resolved   Follow up with your Primary Care Physician  Secondary Diagnosis:	Headache  Assessment and plan of treatment:	resolved  Follow up with your Primary Care Physician  Secondary Diagnosis:	Hypertension  Assessment and plan of treatment:	Continue Diovan   Continue new prescriptions added  Secondary Diagnosis:	Controlled type 2 diabetes mellitus without complication, with long-term current use of insulin  Assessment and plan of treatment:	HgA1C 6.6 on 11/15/2017   Make sure you get your HgA1c checked every three months.  If you take oral diabetes medications, check your blood glucose two times a day.  If you take insulin, check your blood glucose before meals and at bedtime.  It's important not to skip any meals.  Keep a log of your blood glucose results and always take it with you to your doctor appointments.  Keep a list of your current medications including injectables and over the counter medications and bring this medication list with you to all your doctor appointments.  If you have not seen your opthalmologist this year call for appointment.  Check your feet daily for redness, sores, or openings. Do not self treat. If no improvement in two days call your primary care physician for an appointment. Principal Discharge DX:	Hyponatremia  Goal:	maintain stabilization of your blood levels  Assessment and plan of treatment:	Follow up in 1 week and call office at [phone] immediately upon to schedule and make an appointment.    Many medicines can make hyponatremia worse. Discuss all your medicines with your caregiver.  Carefully follow any recommended diet, including any fluid restrictions.  You may be asked to repeat lab tests. Follow these directions.  Avoid alcohol and recreational drugs.  SEEK MEDICAL CARE IF:  You develop worsening nausea, fatigue, headache, confusion, or weakness.  Your original hyponatremia symptoms return.  You have problems following the recommended diet.   SEEK IMMEDIATE MEDICAL CARE IF:  You have a seizure.  You faint.  You have ongoing diarrhea or vomiting  Secondary Diagnosis:	Pain of upper abdomen  Assessment and plan of treatment:	Resolved   Follow up with your Primary Care Physician  Secondary Diagnosis:	Headache  Assessment and plan of treatment:	resolved  Follow up with your Primary Care Physician  Secondary Diagnosis:	Hypertension  Assessment and plan of treatment:	Continue Diovan and Norvasc   Continue new prescriptions added Toprol XL daily   F/u with your PCP in 1 week for recheck of yoru blood pressure and review of your hospital course,  Secondary Diagnosis:	Controlled type 2 diabetes mellitus without complication, with long-term current use of insulin  Assessment and plan of treatment:	HgA1C 6.6 on 11/15/2017   Make sure you get your HgA1c checked every three months.  If you take oral diabetes medications, check your blood glucose two times a day.  If you take insulin, check your blood glucose before meals and at bedtime.  It's important not to skip any meals.  Keep a log of your blood glucose results and always take it with you to your doctor appointments.  Keep a list of your current medications including injectables and over the counter medications and bring this medication list with you to all your doctor appointments.  If you have not seen your opthalmologist this year call for appointment.  Check your feet daily for redness, sores, or openings. Do not self treat. If no improvement in two days call your primary care physician for an appointment.

## 2018-01-26 NOTE — DISCHARGE NOTE ADULT - CARE PROVIDER_API CALL
Felicita Mohamud, Your Primary Care Physician  Phone: [phone]  Fax: (   )    -    Ne[facility] Clinic,   Phone: [phone]  Fax: (   )    -    Alicia Craft ([email or web address]), Internal Medicine  [address] [place] [address]  Phone: [phone]  Fax: [phone]

## 2018-01-26 NOTE — DISCHARGE NOTE ADULT - HOSPITAL COURSE
86F hx of HTN, DM on glipizide, HLD, colitis, cholecystectomy c/o of abdominal pain associated with loose brown stools (up to 2x/day), nausea without emesis, dyspnea on exertion, elevated blood pressures and headache found to have hyponatremia. CT abdomen was negative for acute pathology. NA improved with volume resuscitation/ IVF's. Toprol and Norvasc was added for BP optimization. Pt was observed by Nephrology and advised to follow up at clinic in 1 week for further monitoring and management of her hyponatremia. Spoke to Medical attending and patient deemed medically cleared and stable for discharge. Medications reconciliation was reviewed and revised with attending. Advised to also follow up with her primary care physician upon 1 week of discharge.

## 2018-01-26 NOTE — DISCHARGE NOTE ADULT - SECONDARY DIAGNOSIS.
Pain of upper abdomen Headache Hypertension Controlled type 2 diabetes mellitus without complication, with long-term current use of insulin

## 2018-01-26 NOTE — PROGRESS NOTE ADULT - ATTENDING COMMENTS
contact with question   cell [phone]  [facility]- [phone]

## 2018-01-26 NOTE — DISCHARGE NOTE ADULT - PLAN OF CARE
maintain stabilization of your blood levels Follow up in 1 week and call office at [phone] immediately upon to schedule and make an appointment.    Many medicines can make hyponatremia worse. Discuss all your medicines with your caregiver.  Carefully follow any recommended diet, including any fluid restrictions.  You may be asked to repeat lab tests. Follow these directions.  Avoid alcohol and recreational drugs.  SEEK MEDICAL CARE IF:  You develop worsening nausea, fatigue, headache, confusion, or weakness.  Your original hyponatremia symptoms return.  You have problems following the recommended diet.   SEEK IMMEDIATE MEDICAL CARE IF:  You have a seizure.  You faint.  You have ongoing diarrhea or vomiting Resolved   Follow up with your Primary Care Physician resolved  Follow up with your Primary Care Physician Continue Diovan   Continue new prescriptions added HgA1C 6.6 on 11/15/2017   Make sure you get your HgA1c checked every three months.  If you take oral diabetes medications, check your blood glucose two times a day.  If you take insulin, check your blood glucose before meals and at bedtime.  It's important not to skip any meals.  Keep a log of your blood glucose results and always take it with you to your doctor appointments.  Keep a list of your current medications including injectables and over the counter medications and bring this medication list with you to all your doctor appointments.  If you have not seen your opthalmologist this year call for appointment.  Check your feet daily for redness, sores, or openings. Do not self treat. If no improvement in two days call your primary care physician for an appointment. Continue Diovan and Norvasc   Continue new prescriptions added Toprol XL daily   F/u with your PCP in 1 week for recheck of yoru blood pressure and review of your hospital course,

## 2018-01-26 NOTE — PROGRESS NOTE ADULT - PROBLEM SELECTOR PLAN 1
Sec to diarrhea.  Check urine lytes, On IV fluids for dehydration  Renal eval noted
labs reviewed. serum na improved, 128-->132 , Volume Status : stable  cont monitor s na level  mental status stable  daily basic metabolic panel  if dc - follow up with in clinic 1 wk- office [phone]

## 2018-01-26 NOTE — DISCHARGE NOTE ADULT - PROVIDER TOKENS
FREE:[LAST:[Felicita Mohamud],FIRST:[Your Primary Care Physician],PHONE:[phone]],FAX:[(   )    -]],FREE:[LAST:[Nephrology Clinic],PHONE:[phone]],FAX:[(   )    -]],TOKEN:'[id number]'

## 2018-01-26 NOTE — DISCHARGE NOTE ADULT - ADDITIONAL INSTRUCTIONS
-Follow up with your primary Care Physician in 1 week for blood pressure check and monitoring.  -Follow up in the Nephrology clinic in 1 week. call office at [phone] immediately upon to schedule and make an appointment.   -You may return to the Emergency Department for any return or worsening of your symptoms.

## 2018-01-26 NOTE — DISCHARGE NOTE ADULT - PATIENT PORTAL LINK FT
“You can access the [facility] Patient Portal, offered by [facility], by registering with the following website: [email or web address]”

## 2018-01-26 NOTE — PROGRESS NOTE ADULT - SUBJECTIVE AND OBJECTIVE BOX
[facility] - Nighat / Zeeshan HAM /Vilma/ FATOUMATA Enriquez/ FATOUMATA Lizama/ Ty Jackson / ANDREW Valle  ---------------------------------------------------------------------------------------------------------------  seen and examined today for hyponatremia  Interval : improved s na level     VITALS:  T(F): 97.7 (01-26-18 @ 05:06), Max: 97.7 (01-26-18 @ 05:06)  HR: 68 (01-26-18 @ 05:06)  BP: 182/79 (01-26-18 @ 05:06)  RR: 16 (01-26-18 @ 05:06)  SpO2: 100% (01-26-18 @ 05:06)  Wt(kg): --    01-25 @ 07:01 - 01-26 @ 07:00  --------------------------------------------------------  IN: 420 mL / OUT: 0 mL / NET: 420 mL    01-26 @ 07:01 - 01-26 @ 10:09  --------------------------------------------------------  IN: 720 mL / OUT: 0 mL / NET: 720 mL    Physical Exam   Constitutional: NAD  Neck: Supple.  Respiratory: Bilateral equal breath sounds, no Crackles present.  Cardiovascular: S1, S2 normal, positive Murmur  Gastrointestinal: Bowel Sounds present, soft, non tender.  Extremities: no Edema Feet,  Pulse present  Neurological: Alert and Oriented x 3, no focal deficits  Psychiatric: Normal mood, normal affect  Data:  naproxen (Nausea)    Hospital Medications:   MEDICATIONS  (STANDING):  dextrose 5%. 1000 milliLiter(s) (50 mL/Hr) IV Continuous <Continuous>  dextrose 50% Injectable 12.5 Gram(s) IV Push once  folic acid 1 milliGRAM(s) Oral daily  heparin  Injectable 5000 Unit(s) SubCutaneous every 8 hours  insulin glargine Injectable (LANTUS) 12 Unit(s) SubCutaneous at bedtime  insulin lispro (HumaLOG) corrective regimen sliding scale   SubCutaneous three times a day before meals  metoprolol succinate ER 50 milliGRAM(s) Oral daily  simvastatin 40 milliGRAM(s) Oral at bedtime  sodium chloride 0.9%. 1000 milliLiter(s) (60 mL/Hr) IV Continuous <Continuous>  valsartan 160 milliGRAM(s) Oral daily    01-26    132<L>  |  95<L>  |  9   ----------------------------<  227<H>  4.7   |  25  |  0.79    Ca    9.1      26 Jan 2018 06:38    TPro  7.9  /  Alb  4.4  /  TBili  0.4  /  DBili      /  AST  21  /  ALT  13  /  AlkPhos  48  01-24    Creatinine Trend: 0.79 <--, 0.69 <--, 1.05 <--, 0.70 <--                        13.5   6.43  )-----------( 229      ( 25 Jan 2018 07:41 )             38.6

## 2018-01-26 NOTE — DISCHARGE NOTE ADULT - MEDICATION SUMMARY - MEDICATIONS TO TAKE
I will START or STAY ON the medications listed below when I get home from the hospital:    valsartan 160 mg oral tablet  -- 1 tab(s) by mouth once a day, take in the morning   -- Indication: For Benign essential hypertension    Lantus Solostar Pen 100 units/mL subcutaneous solution  -- 12 unit(s) subcutaneous once a day (at bedtime)  -- Indication: For Controlled type 2 diabetes mellitus without complication, with long-term current use of insulin    pravastatin 40 mg oral tablet  -- 1 tab(s) by mouth once a day  -- Indication: For Hypercholesterolemia    metoprolol succinate 50 mg oral tablet, extended release  -- 1 tab(s) by mouth once a day  -- Indication: For Benign essential hypertension    amLODIPine 5 mg oral tablet  -- 1 tab(s) by mouth once a day  -- Indication: For Benign essential hypertension    folic acid 1 mg oral tablet  -- 1 tab(s) by mouth once a day  -- Indication: For supplement

## 2018-10-15 ENCOUNTER — EMERGENCY (EMERGENCY)
Facility: HOSPITAL | Age: 83
LOS: 1 days | Discharge: HOME | End: 2018-10-15
Attending: EMERGENCY MEDICINE | Admitting: EMERGENCY MEDICINE
Payer: MEDICARE

## 2018-10-15 VITALS
RESPIRATION RATE: 18 BRPM | OXYGEN SATURATION: 98 % | DIASTOLIC BLOOD PRESSURE: 80 MMHG | TEMPERATURE: 98 F | HEART RATE: 79 BPM | SYSTOLIC BLOOD PRESSURE: 173 MMHG

## 2018-10-15 VITALS
RESPIRATION RATE: 18 BRPM | TEMPERATURE: 99 F | SYSTOLIC BLOOD PRESSURE: 170 MMHG | OXYGEN SATURATION: 97 % | DIASTOLIC BLOOD PRESSURE: 90 MMHG | HEART RATE: 88 BPM

## 2018-10-15 LAB
ALBUMIN SERPL ELPH-MCNC: 4.4 G/DL — SIGNIFICANT CHANGE UP (ref 3.3–5)
ALP SERPL-CCNC: 42 U/L — SIGNIFICANT CHANGE UP (ref 40–120)
ALT FLD-CCNC: 45 U/L — HIGH (ref 4–33)
APPEARANCE UR: CLEAR — SIGNIFICANT CHANGE UP
APTT BLD: 28.5 SEC — SIGNIFICANT CHANGE UP (ref 27.5–37.4)
AST SERPL-CCNC: SIGNIFICANT CHANGE UP U/L (ref 4–32)
BACTERIA # UR AUTO: NEGATIVE — SIGNIFICANT CHANGE UP
BASE EXCESS BLDV CALC-SCNC: -1.5 MMOL/L — SIGNIFICANT CHANGE UP
BASOPHILS # BLD AUTO: 0.01 K/UL — SIGNIFICANT CHANGE UP (ref 0–0.2)
BASOPHILS NFR BLD AUTO: 0.1 % — SIGNIFICANT CHANGE UP (ref 0–2)
BILIRUB SERPL-MCNC: 0.5 MG/DL — SIGNIFICANT CHANGE UP (ref 0.2–1.2)
BILIRUB UR-MCNC: NEGATIVE — SIGNIFICANT CHANGE UP
BLOOD UR QL VISUAL: NEGATIVE — SIGNIFICANT CHANGE UP
BUN SERPL-MCNC: 12 MG/DL — SIGNIFICANT CHANGE UP (ref 7–23)
BUN SERPL-MCNC: 13 MG/DL — SIGNIFICANT CHANGE UP (ref 7–23)
CALCIUM SERPL-MCNC: 7.9 MG/DL — LOW (ref 8.4–10.5)
CALCIUM SERPL-MCNC: 9 MG/DL — SIGNIFICANT CHANGE UP (ref 8.4–10.5)
CHLORIDE SERPL-SCNC: 100 MMOL/L — SIGNIFICANT CHANGE UP (ref 98–107)
CHLORIDE SERPL-SCNC: 93 MMOL/L — LOW (ref 98–107)
CO2 SERPL-SCNC: 18 MMOL/L — LOW (ref 22–31)
CO2 SERPL-SCNC: 19 MMOL/L — LOW (ref 22–31)
COLOR SPEC: SIGNIFICANT CHANGE UP
CREAT SERPL-MCNC: 0.72 MG/DL — SIGNIFICANT CHANGE UP (ref 0.5–1.3)
CREAT SERPL-MCNC: 0.74 MG/DL — SIGNIFICANT CHANGE UP (ref 0.5–1.3)
EOSINOPHIL # BLD AUTO: 0.01 K/UL — SIGNIFICANT CHANGE UP (ref 0–0.5)
EOSINOPHIL NFR BLD AUTO: 0.1 % — SIGNIFICANT CHANGE UP (ref 0–6)
GAS PNL BLDV: 127 MMOL/L — LOW (ref 136–146)
GLUCOSE BLDV-MCNC: 218 — HIGH (ref 70–99)
GLUCOSE SERPL-MCNC: 152 MG/DL — HIGH (ref 70–99)
GLUCOSE SERPL-MCNC: 206 MG/DL — HIGH (ref 70–99)
GLUCOSE UR-MCNC: NEGATIVE — SIGNIFICANT CHANGE UP
HCO3 BLDV-SCNC: 23 MMOL/L — SIGNIFICANT CHANGE UP (ref 20–27)
HCT VFR BLD CALC: 37.2 % — SIGNIFICANT CHANGE UP (ref 34.5–45)
HCT VFR BLDV CALC: 39.7 % — SIGNIFICANT CHANGE UP (ref 34.5–45)
HGB BLD-MCNC: 12.5 G/DL — SIGNIFICANT CHANGE UP (ref 11.5–15.5)
HGB BLDV-MCNC: 12.9 G/DL — SIGNIFICANT CHANGE UP (ref 11.5–15.5)
HYALINE CASTS # UR AUTO: NEGATIVE — SIGNIFICANT CHANGE UP
IMM GRANULOCYTES # BLD AUTO: 0.04 # — SIGNIFICANT CHANGE UP
IMM GRANULOCYTES NFR BLD AUTO: 0.4 % — SIGNIFICANT CHANGE UP (ref 0–1.5)
INR BLD: 0.95 — SIGNIFICANT CHANGE UP (ref 0.88–1.17)
KETONES UR-MCNC: NEGATIVE — SIGNIFICANT CHANGE UP
LEUKOCYTE ESTERASE UR-ACNC: NEGATIVE — SIGNIFICANT CHANGE UP
LYMPHOCYTES # BLD AUTO: 18.3 % — SIGNIFICANT CHANGE UP (ref 13–44)
LYMPHOCYTES # BLD AUTO: 2.02 K/UL — SIGNIFICANT CHANGE UP (ref 1–3.3)
MCHC RBC-ENTMCNC: 30.8 PG — SIGNIFICANT CHANGE UP (ref 27–34)
MCHC RBC-ENTMCNC: 33.6 % — SIGNIFICANT CHANGE UP (ref 32–36)
MCV RBC AUTO: 91.6 FL — SIGNIFICANT CHANGE UP (ref 80–100)
MONOCYTES # BLD AUTO: 0.65 K/UL — SIGNIFICANT CHANGE UP (ref 0–0.9)
MONOCYTES NFR BLD AUTO: 5.9 % — SIGNIFICANT CHANGE UP (ref 2–14)
NEUTROPHILS # BLD AUTO: 8.28 K/UL — HIGH (ref 1.8–7.4)
NEUTROPHILS NFR BLD AUTO: 75.2 % — SIGNIFICANT CHANGE UP (ref 43–77)
NITRITE UR-MCNC: NEGATIVE — SIGNIFICANT CHANGE UP
NRBC # FLD: 0 — SIGNIFICANT CHANGE UP
PCO2 BLDV: 43 MMHG — SIGNIFICANT CHANGE UP (ref 41–51)
PH BLDV: 7.35 PH — SIGNIFICANT CHANGE UP (ref 7.32–7.43)
PH UR: 7 — SIGNIFICANT CHANGE UP (ref 5–8)
PLATELET # BLD AUTO: 196 K/UL — SIGNIFICANT CHANGE UP (ref 150–400)
PMV BLD: 9.9 FL — SIGNIFICANT CHANGE UP (ref 7–13)
PO2 BLDV: 41 MMHG — HIGH (ref 35–40)
POTASSIUM BLDV-SCNC: SIGNIFICANT CHANGE UP MMOL/L (ref 3.4–4.5)
POTASSIUM SERPL-MCNC: 4.1 MMOL/L — SIGNIFICANT CHANGE UP (ref 3.5–5.3)
POTASSIUM SERPL-MCNC: SIGNIFICANT CHANGE UP MMOL/L (ref 3.5–5.3)
POTASSIUM SERPL-SCNC: 4.1 MMOL/L — SIGNIFICANT CHANGE UP (ref 3.5–5.3)
POTASSIUM SERPL-SCNC: SIGNIFICANT CHANGE UP MMOL/L (ref 3.5–5.3)
PROT SERPL-MCNC: SIGNIFICANT CHANGE UP G/DL (ref 6–8.3)
PROT UR-MCNC: 20 — SIGNIFICANT CHANGE UP
PROTHROM AB SERPL-ACNC: 10.9 SEC — SIGNIFICANT CHANGE UP (ref 9.8–13.1)
RBC # BLD: 4.06 M/UL — SIGNIFICANT CHANGE UP (ref 3.8–5.2)
RBC # FLD: 12.2 % — SIGNIFICANT CHANGE UP (ref 10.3–14.5)
RBC CASTS # UR COMP ASSIST: SIGNIFICANT CHANGE UP (ref 0–?)
SAO2 % BLDV: 76 % — SIGNIFICANT CHANGE UP (ref 60–85)
SODIUM SERPL-SCNC: 129 MMOL/L — LOW (ref 135–145)
SODIUM SERPL-SCNC: 135 MMOL/L — SIGNIFICANT CHANGE UP (ref 135–145)
SP GR SPEC: 1.01 — SIGNIFICANT CHANGE UP (ref 1–1.04)
SQUAMOUS # UR AUTO: SIGNIFICANT CHANGE UP
TROPONIN T, HIGH SENSITIVITY: 13 NG/L — SIGNIFICANT CHANGE UP (ref ?–14)
TROPONIN T, HIGH SENSITIVITY: 13 NG/L — SIGNIFICANT CHANGE UP (ref ?–14)
UROBILINOGEN FLD QL: NORMAL — SIGNIFICANT CHANGE UP
WBC # BLD: 11.01 K/UL — HIGH (ref 3.8–10.5)
WBC # FLD AUTO: 11.01 K/UL — HIGH (ref 3.8–10.5)
WBC UR QL: SIGNIFICANT CHANGE UP (ref 0–?)

## 2018-10-15 PROCEDURE — 71045 X-RAY EXAM CHEST 1 VIEW: CPT | Mod: 26

## 2018-10-15 PROCEDURE — 73110 X-RAY EXAM OF WRIST: CPT | Mod: 26,77,LT

## 2018-10-15 PROCEDURE — 73080 X-RAY EXAM OF ELBOW: CPT | Mod: 26,LT

## 2018-10-15 PROCEDURE — 73110 X-RAY EXAM OF WRIST: CPT | Mod: 26,LT

## 2018-10-15 PROCEDURE — 73130 X-RAY EXAM OF HAND: CPT | Mod: 26,LT

## 2018-10-15 PROCEDURE — 73090 X-RAY EXAM OF FOREARM: CPT | Mod: 26,LT

## 2018-10-15 PROCEDURE — 73030 X-RAY EXAM OF SHOULDER: CPT | Mod: 26,LT

## 2018-10-15 PROCEDURE — 73020 X-RAY EXAM OF SHOULDER: CPT | Mod: 26,59,LT

## 2018-10-15 PROCEDURE — 99284 EMERGENCY DEPT VISIT MOD MDM: CPT | Mod: GC

## 2018-10-15 PROCEDURE — 72170 X-RAY EXAM OF PELVIS: CPT | Mod: 26

## 2018-10-15 RX ORDER — OXYCODONE HYDROCHLORIDE 5 MG/1
1 TABLET ORAL
Qty: 8 | Refills: 0 | OUTPATIENT
Start: 2018-10-15 | End: 2018-10-16

## 2018-10-15 RX ORDER — ACETAMINOPHEN 500 MG
975 TABLET ORAL ONCE
Qty: 0 | Refills: 0 | Status: COMPLETED | OUTPATIENT
Start: 2018-10-15 | End: 2018-10-15

## 2018-10-15 RX ORDER — LIDOCAINE HCL 20 MG/ML
10 VIAL (ML) INJECTION ONCE
Qty: 0 | Refills: 0 | Status: COMPLETED | OUTPATIENT
Start: 2018-10-15 | End: 2018-10-15

## 2018-10-15 RX ADMIN — Medication 10 MILLILITER(S): at 12:16

## 2018-10-15 RX ADMIN — Medication 975 MILLIGRAM(S): at 10:49

## 2018-10-15 NOTE — ED PROVIDER NOTE - PROGRESS NOTE DETAILS
pt states she does not want to be admitted to the hospital, discussed with pt's son when he arrived at bedside and they will decide whether to AMA or stay Pt and HHA state that pt still does not want to stay, understand risks of discharge without further work up given pre syncopal episode this morning. Will dc pt (AMA) with recommendation for PCP fu in 24-48 hours, return precautions, and follow up outpatient with ortho s/p reduction and splint

## 2018-10-15 NOTE — ED PROVIDER NOTE - ATTENDING CONTRIBUTION TO CARE
herman: 88 yo who states she slipped and fell landing to rt side. she states she recalls the incident. denies passing out. main complaint is pain to left wrist.   exam: no head or neck tenderness, swelling or ecchymosis. only area of pain is left wrist.   xray: comminuted displaced fx as noted.   labs pending. orthopedics consulted. herman: 88 yo who states she slipped and fell landing to rt side. she states she recalls the incident. denies passing out. main complaint is pain to left wrist.   exam: no head or neck tenderness, swelling or ecchymosis. only area of pain is left wrist.   xray: comminuted displaced fx as noted.   ekg: inverted t 1,l. similar to 1/17  labs pending. orthopedics consulted.

## 2018-10-15 NOTE — CONSULT NOTE ADULT - SUBJECTIVE AND OBJECTIVE BOX
CC: fall  HPI:    87yFemale presents s/p mechanical fall. She denies LOC/headstrike. Reports tripping and falling onto her left side. She noticed deformity of her left wrist and her grandson called an ambulance. She denies cp/sob, and tingling/numbness in the extremity.     Review of systems: As per HPI, otherwise negative.     PAST MEDICAL & SURGICAL HISTORY:  HTN - Hypertension  Hypercholesterolemia  Diabetes mellitus  S/P umbilical hernia repair, follow-up exam  S/P cholecystectomy    FAMILY HISTORY:  No pertinent family history in first degree relatives    Home Medications:  amLODIPine 5 mg oral tablet: 1 tab(s) orally once a day (26 Jan 2018 15:29)  folic acid 1 mg oral tablet: 1 tab(s) orally once a day (24 Jan 2018 19:23)  Lantus Solostar Pen 100 units/mL subcutaneous solution: 12 unit(s) subcutaneous once a day (at bedtime) (24 Jan 2018 19:23)  pravastatin 40 mg oral tablet: 1 tab(s) orally once a day (24 Jan 2018 19:23)  valsartan 160 mg oral tablet: 1 tab(s) orally once a day, take in the morning  (26 Jan 2018 15:29)        T(C): 36.4 (10-15-18 @ 09:00), Max: 36.4 (10-15-18 @ 09:00)  HR: 79 (10-15-18 @ 09:00) (79 - 79)  BP: 173/80 (10-15-18 @ 09:00) (173/80 - 173/80)  RR: 18 (10-15-18 @ 09:00) (18 - 18)  SpO2: 98% (10-15-18 @ 09:00) (98% - 98%)  Wt(kg): --                        12.5   11.01 )-----------( 196      ( 15 Oct 2018 09:56 )             37.2     10-15    129<L>  |  93<L>  |  13  ----------------------------<  206<H>  Test not performed SPECIMEN GROSSLY HEMOLYZED   |  18<L>  |  0.74    Ca    9.0      15 Oct 2018 09:56    TPro  Test not performed SPECIMEN GROSSLY HEMOLYZED  /  Alb  4.4  /  TBili  0.5  /  DBili  x   /  AST  Test not performed SPECIMEN GROSSLY HEMOLYZED  /  ALT  45<H>  /  AlkPhos  42  10-15      EXAM:  Gen: NAD  Resp: nonlabored  Extr:  LUE:  visible deformity at wrist  No open wounds  AIN/PIN/U motor intact  SILT AIN/PIN/U  fingers WWP      Imaging  < from: Xray Wrist 3 Views, Left (10.15.18 @ 11:47) >  EXAM:  RAD WRIST COMP MIN 3 VIEWS LT        PROCEDURE DATE:  Oct 15 2018         INTERPRETATION:  TIME OF EXAM: October 15, 2018 at 10:23 AM    CLINICAL INFORMATION: Fall with pain to left wrist    TECHNIQUE:   AP, oblique and lateral radiographs of the left wrist    INTERPRETATION:     There is a fracture through the distal radial metaphysis with impaction   and dorsal displacement of the distal fracture fragment. No associated   ulnar fracture. Carpal bones are normally aligned.      COMPARISON:  None available      IMPRESSION:  Fracture distal radius.    < end of copied text >      Procedure:  after proceeding with hematoma block (5cc of 2% lidocaine) patient's LUE was hung in traction, closed reduced, and placed in sugartong splint. Neurovascularly intact post reduction. Post reduction xrays reviewed showing good alignment    A/P: This is a 87y Female who presents today with left distal radius fracture s/p mechanical fall    - sugartong splint LUE  - NWB LUE  - No acute surgical intervention at this time  - Care per primary team   - f/u outpatient with Dr. Spear in 7-10 days

## 2018-10-15 NOTE — ED PROVIDER NOTE - OBJECTIVE STATEMENT
88 yo F hx HTN, HLD, IDDM2, presenting s/p unwitnessed fall at 6 am. Denies hitting head/LOC, no AC use. Complains of L arm pain after fall, denies headache, chest pain, abdominal pain, shortness of breath, or hip pain. Walks with cane. Patient lives at home with grandson who called neighbor for help, FSG was 130 (checked after fall, fasting). Patient states she was coming out of the restroom and felt "lousy" and fell. Denies sensation of room spinning, + reports feeling like she was going to pass out. Denies recent falls. Reports urinary urgency and frequency, stating she wakes up at all hours of the night to urinate, no dysuria/hematuria.

## 2018-10-15 NOTE — ED PROVIDER NOTE - PHYSICAL EXAMINATION
VITALS: reviewed  GEN: NAD, A & O x 3  HEAD/EYES: NCAT, PERRL, EOMI, anicteric sclerae  ENT: mucus membranes moist, oropharynx WNL, trachea midline, no JVD  RESP: lungs CTA with equal breath sounds bilaterally, chest wall nontender and atraumatic  CV: heart with reg rhythm S1, S2, no murmur; distal pulses intact and symmetric bilaterally  ABDOMEN: normoactive bowel sounds, soft, nondistended, nontender, no palpable masses  : no CVAT  MSK: L shoulder ttp and tenderness with active ROM, L humerus ttp without bony deformity, L wrist edema and limited ROM 2/2 pain. the back is without midline or lateral tenderness, there is no spinal deformity or stepoff and the back is ranged painlessly. the neck has no midline tenderness, deformity, or stepoff, and is ranged painlessly.  SKIN: warm, dry, no rash, no bruising, no cyanosis. color appropriate for ethnicity  NEURO: alert, mentating appropriately, no facial asymmetry. gross sensation, motor, coordination are intact  PSYCH: Affect appropriate

## 2018-10-15 NOTE — ED PROVIDER NOTE - NS ED ROS FT
CONST: no fevers, no chills, no trauma  EYES: no pain, no visual disturbances  ENT: no sore throat, no epistaxis, no rhinorrhea, no hearing changes  CV: no chest pain, no palpitations, no orthopnea, no extremity pain or swelling  RESP: no shortness of breath, no cough, no sputum, no pleurisy, no wheezing  ABD: no abdominal pain, no nausea, no vomiting, no diarrhea, no black or bloody stool  : no dysuria, no hematuria, + frequency, + urgency  MSK: no back pain, no neck pain, + extremity pain  NEURO: no headache, no sensory disturbances, no focal weakness, no dizziness  HEME: no easy bleeding or bruising  SKIN: no diaphoresis, no rash

## 2018-10-15 NOTE — ED ADULT NURSE NOTE - OBJECTIVE STATEMENT
Pt to bed 27. Alert and oriented x3. Pt states that she had a syncopal episode today. Pt with left forearm injury. No visible head trauma noted. IVL placed. Bloods drawn. Safety maintained. Will continue to monitor.

## 2018-10-15 NOTE — ED ADULT NURSE NOTE - NSIMPLEMENTINTERV_GEN_ALL_ED
Implemented All Fall with Harm Risk Interventions:  [place] to call system. Call bell, personal items and telephone within reach. Instruct patient to call for assistance. Room bathroom lighting operational. Non-slip footwear when patient is off stretcher. Physically safe environment: no spills, clutter or unnecessary equipment. Stretcher in lowest position, wheels locked, appropriate side rails in place. Provide visual cue, wrist band, yellow gown, etc. Monitor gait and stability. Monitor for mental status changes and reorient to person, place, and time. Review medications for side effects contributing to fall risk. Reinforce activity limits and safety measures with patient and family. Provide visual clues: red socks.

## 2018-10-15 NOTE — ED PROVIDER NOTE - MEDICAL DECISION MAKING DETAILS
88 yo F presenting with dizziness/fall and LUE pain after fall. R/O ACS, no focal neuro deficits, r/o UTI, HEART score 5 observe/tba 86 yo F presenting with dizziness/fall and LUE pain after fall. R/O ACS, no focal neuro deficits, r/o UTI, HEART score 5, tba syncope

## 2018-10-15 NOTE — ED ADULT TRIAGE NOTE - CHIEF COMPLAINT QUOTE
Per EMS pt became dizzy in the bathroom and fell, injuring left wrist, denies LOC or head trauma. Fingers warm and mobile on left hand radial pulse palpable.  PMH HTN, DM

## 2018-10-16 LAB
BACTERIA UR CULT: SIGNIFICANT CHANGE UP
SPECIMEN SOURCE: SIGNIFICANT CHANGE UP

## 2018-10-22 ENCOUNTER — APPOINTMENT (OUTPATIENT)
Dept: ORTHOPEDIC SURGERY | Facility: CLINIC | Age: 83
End: 2018-10-22
Payer: MEDICARE

## 2018-10-22 VITALS
DIASTOLIC BLOOD PRESSURE: 73 MMHG | WEIGHT: 140 LBS | HEART RATE: 93 BPM | HEIGHT: 62 IN | BODY MASS INDEX: 25.76 KG/M2 | SYSTOLIC BLOOD PRESSURE: 195 MMHG

## 2018-10-22 DIAGNOSIS — Z86.79 PERSONAL HISTORY OF OTHER DISEASES OF THE CIRCULATORY SYSTEM: ICD-10-CM

## 2018-10-22 DIAGNOSIS — Z86.39 PERSONAL HISTORY OF OTHER ENDOCRINE, NUTRITIONAL AND METABOLIC DISEASE: ICD-10-CM

## 2018-10-22 DIAGNOSIS — Z60.2 PROBLEMS RELATED TO LIVING ALONE: ICD-10-CM

## 2018-10-22 PROCEDURE — 99204 OFFICE O/P NEW MOD 45 MIN: CPT | Mod: 57

## 2018-10-22 PROCEDURE — 25600 CLTX DST RDL FX/EPHYS SEP WO: CPT | Mod: LT

## 2018-10-22 PROCEDURE — 73110 X-RAY EXAM OF WRIST: CPT | Mod: LT

## 2018-10-22 RX ORDER — METFORMIN HYDROCHLORIDE 625 MG/1
TABLET ORAL
Refills: 0 | Status: ACTIVE | COMMUNITY

## 2018-10-22 RX ORDER — ACETAMINOPHEN 325 MG/1
TABLET, FILM COATED ORAL
Refills: 0 | Status: ACTIVE | COMMUNITY

## 2018-10-22 SDOH — SOCIAL STABILITY - SOCIAL INSECURITY: PROBLEMS RELATED TO LIVING ALONE: Z60.2

## 2018-10-24 NOTE — ED CDU PROVIDER INITIAL DAY NOTE - CHPI ED SYMPTOMS NEG
Follow Up:  cavitary TB    Interval History/ROS: no GI distress, decreased cough, fatigued    Allergies  Advil (Swelling)  Motrin (Swelling)  Tylenol (Swelling)    ANTIMICROBIALS:  ethambutol 1200 daily  isoniazid 300 daily  pyrazinamide 1500 daily  rifampin 600 daily      OTHER MEDS:  MEDICATIONS  (STANDING):  pantoprazole    Tablet 40 daily  sodium chloride 3%  Inhalation 3 every 8 hours PRN      Vital Signs Last 24 Hrs  T(C): 36.7 (24 Oct 2018 13:41), Max: 37.3 (23 Oct 2018 20:07)  T(F): 98 (24 Oct 2018 13:41), Max: 99.2 (23 Oct 2018 20:07)  HR: 95 (24 Oct 2018 13:41) (86 - 112)  BP: 109/73 (24 Oct 2018 13:41) (109/73 - 117/71)  BP(mean): --  RR: 18 (24 Oct 2018 13:41) (17 - 18)  SpO2: 99% (24 Oct 2018 13:41) (98% - 100%)    PHYSICAL EXAM:  General: WN/WD NAD, Non-toxic  Neurology: A&Ox3, nonfocal  Respiratory: Clear to auscultation bilaterally  Abdominal: Soft, Non-tender, non-distended, normal bowel sounds  Extremities: No edema,  Line Sites: Clear  Skin: No rash                   10.6   14.11 )-----------( 342      ( 24 Oct 2018 05:42 )             33.9       10-24    138  |  100  |  13  ----------------------------<  91  4.4   |  25  |  0.77    Ca    9.1      24 Oct 2018 05:42  Phos  3.8     10-23  Mg     1.7     10-23    TPro  8.3  /  Alb  3.5  /  TBili  0.3  /  DBili  x   /  AST  40  /  ALT  95<H>  /  AlkPhos  74  10-24          MICROBIOLOGY:  SPUTUM  10-23-18 --  --  -- NO AFB SEEN      SPUTUM  10-14-18 --  --  -- MODERATE AFB on Smear Mt[facility] group      SPUTUM  10-13-18 --  --  --      SPUTUM  10-12-18 --  --  --      SPUTUM  10-12-18 --  --  --      SPUTUM  10-10-18 --  --  -- 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

## 2018-11-05 ENCOUNTER — APPOINTMENT (OUTPATIENT)
Dept: ORTHOPEDIC SURGERY | Facility: CLINIC | Age: 83
End: 2018-11-05
Payer: MEDICARE

## 2018-11-05 VITALS
HEIGHT: 62 IN | SYSTOLIC BLOOD PRESSURE: 173 MMHG | HEART RATE: 82 BPM | DIASTOLIC BLOOD PRESSURE: 91 MMHG | BODY MASS INDEX: 25.76 KG/M2 | WEIGHT: 140 LBS

## 2018-11-05 PROCEDURE — 73110 X-RAY EXAM OF WRIST: CPT | Mod: LT

## 2018-11-05 PROCEDURE — 99024 POSTOP FOLLOW-UP VISIT: CPT

## 2018-11-05 PROCEDURE — 29075 APPL CST ELBW FNGR SHORT ARM: CPT | Mod: 58,LT

## 2018-11-26 ENCOUNTER — APPOINTMENT (OUTPATIENT)
Dept: ORTHOPEDIC SURGERY | Facility: CLINIC | Age: 83
End: 2018-11-26
Payer: MEDICARE

## 2018-11-26 VITALS
HEART RATE: 90 BPM | SYSTOLIC BLOOD PRESSURE: 180 MMHG | BODY MASS INDEX: 25.76 KG/M2 | WEIGHT: 140 LBS | HEIGHT: 62 IN | DIASTOLIC BLOOD PRESSURE: 76 MMHG

## 2018-11-26 PROCEDURE — 99024 POSTOP FOLLOW-UP VISIT: CPT

## 2018-11-26 PROCEDURE — 73110 X-RAY EXAM OF WRIST: CPT | Mod: LT

## 2018-12-11 ENCOUNTER — APPOINTMENT (OUTPATIENT)
Dept: ORTHOPEDIC SURGERY | Facility: CLINIC | Age: 83
End: 2018-12-11
Payer: MEDICARE

## 2018-12-11 VITALS
HEART RATE: 101 BPM | HEIGHT: 62 IN | WEIGHT: 140 LBS | DIASTOLIC BLOOD PRESSURE: 85 MMHG | SYSTOLIC BLOOD PRESSURE: 171 MMHG | BODY MASS INDEX: 25.76 KG/M2

## 2018-12-11 PROCEDURE — 73110 X-RAY EXAM OF WRIST: CPT | Mod: LT

## 2018-12-11 PROCEDURE — 99024 POSTOP FOLLOW-UP VISIT: CPT

## 2019-01-14 ENCOUNTER — APPOINTMENT (OUTPATIENT)
Dept: ORTHOPEDIC SURGERY | Facility: CLINIC | Age: 84
End: 2019-01-14
Payer: MEDICARE

## 2019-01-14 VITALS — HEART RATE: 116 BPM | DIASTOLIC BLOOD PRESSURE: 83 MMHG | SYSTOLIC BLOOD PRESSURE: 154 MMHG | HEIGHT: 62 IN

## 2019-01-14 PROCEDURE — 73110 X-RAY EXAM OF WRIST: CPT | Mod: LT

## 2019-01-14 PROCEDURE — 99024 POSTOP FOLLOW-UP VISIT: CPT

## 2019-01-14 NOTE — DISCUSSION/SUMMARY
[de-identified] : The patient continues to have stiffness and swelling in the left wrist and hand. She needs physical therapy. I have explained that unless she gets motion soon and she will always have stiffness in her wrist. A new physical therapy prescription is written. She will return in one month.

## 2019-01-14 NOTE — HISTORY OF PRESENT ILLNESS
[All Other ROS Normal] : All other review of systems are negative except as noted [Joint Pain] : joint pain [All Hx] : past medical, family, and social [All] : medication and allergy [FreeTextEntry1] : left wrist fracture  [FreeTextEntry2] : 87 year old RHD female presents 13 weeks following fracture of the left distal radius. She no longer wears a wrist immobilizer. She has not been attending PT.  She is working on ROM exercises for her hand and fingers on her own at home. She complains of pain and swelling on the dorsum of the wrist.  She has some swelling in her fingers as well.  She is taking Tylenol as needed.

## 2019-01-14 NOTE — PHYSICAL EXAM
[Poor Appearance] : well-appearing [Acute Distress] : not in acute distress [Obese] : not obese [Normal] : normal [UE] : Sensory: Intact in bilateral upper extremities [FreeTextEntry2] : The patient has no respiratory distress. Mood and affect are normal. The patient is alert and oriented to person, place and time.\par There is no pain with motion of the elbows. Examination of the left wrist demonstrates minimal tenderness. There is wrist stiffness and swelling. There is finger stiffness.   Tendon function is intact. The skin is intact. There is no lymphedema. [de-identified] : AP, lateral and oblique x-rays of the left wrist demonstrate continued healing of the distal radius fracture

## 2019-02-19 ENCOUNTER — APPOINTMENT (OUTPATIENT)
Dept: ORTHOPEDIC SURGERY | Facility: CLINIC | Age: 84
End: 2019-02-19
Payer: MEDICARE

## 2019-02-19 VITALS
SYSTOLIC BLOOD PRESSURE: 169 MMHG | DIASTOLIC BLOOD PRESSURE: 76 MMHG | WEIGHT: 140 LBS | HEART RATE: 91 BPM | HEIGHT: 62 IN | BODY MASS INDEX: 25.76 KG/M2

## 2019-02-19 DIAGNOSIS — S52.532D COLLES' FRACTURE OF LEFT RADIUS, SUBSEQUENT ENCOUNTER FOR CLOSED FRACTURE WITH ROUTINE HEALING: ICD-10-CM

## 2019-02-19 PROCEDURE — 99213 OFFICE O/P EST LOW 20 MIN: CPT

## 2019-02-19 NOTE — HISTORY OF PRESENT ILLNESS
[de-identified] : The patient presents for months following left distal radius fracture. She has been attending physical therapy and reports improvement. She has only mild pain and has less stiffness than before. She is not taking any medication for pain.

## 2019-02-19 NOTE — REVIEW OF SYSTEMS
[Joint Pain] : no joint pain [Joint Stiffness] : joint stiffness [Joint Swelling] : joint swelling [Negative] : Heme/Lymph

## 2019-02-19 NOTE — PHYSICAL EXAM
[UE] : Sensory: Intact in bilateral upper extremities [Bicep] : biceps 2+ and symmetric bilaterally [B.R.] : biceps 2+ and symmetric bilaterally [Rad] : radial 2+ and symmetric bilaterally [Normal] : Alert and in no acute distress [Poor Appearance] : well-appearing [Acute Distress] : not in acute distress [Obese] : not obese [de-identified] : The patient has no respiratory distress. Mood and affect are normal. The patient is alert and oriented to person, place and time.\par Left upper extremity examination demonstrates no pain with motion of the shoulders or elbows. There is no tenderness at the left distal radius. There is mild swelling. She has dorsiflexion and volar flexion of 45°. She has some limitation of finger motion. Tendon function is intact. The skin is intact. There is no lymphedema.

## 2019-02-19 NOTE — DISCUSSION/SUMMARY
[de-identified] : The patient's left wrist fracture is healing well. She will complete her physical therapy program. She will work on a home exercise program. She will return as needed.

## 2019-03-25 NOTE — H&P ADULT. - MUSCULOSKELETAL
"Requested Prescriptions   Pending Prescriptions Disp Refills     escitalopram (LEXAPRO) 20 MG tablet [Pharmacy Med Name: ESCITALOPRAM 20 MG TABLET]  Last Written Prescription Date:  3/19/2019  Last Fill Quantity: 90 tablet,  # refills: 0   Last office visit: 10/31/2018 with prescribing provider:  Aleks   Future Office Visit:     90 tablet 1     Sig: TAKE 1 TABLET (20 MG) BY MOUTH DAILY    SSRIs Protocol Passed - 3/24/2019 12:22 AM       Passed - Recent (12 mo) or future (30 days) visit within the authorizing provider's specialty    Patient had office visit in the last 12 months or has a visit in the next 30 days with authorizing provider or within the authorizing provider's specialty.  See \"Patient Info\" tab in inbasket, or \"Choose Columns\" in Meds & Orders section of the refill encounter.             Passed - Medication is active on med list       Passed - Patient is age 18 or older       Passed - No active pregnancy on record       Passed - No positive pregnancy test in last 12 months          " details… detailed exam ROM intact

## 2019-04-29 ENCOUNTER — INPATIENT (INPATIENT)
Facility: HOSPITAL | Age: 84
LOS: 3 days | Discharge: HOME HEALTH | End: 2019-05-03
Attending: INTERNAL MEDICINE | Admitting: INTERNAL MEDICINE
Payer: MEDICARE

## 2019-04-29 VITALS
RESPIRATION RATE: 24 BRPM | DIASTOLIC BLOOD PRESSURE: 92 MMHG | TEMPERATURE: 100 F | SYSTOLIC BLOOD PRESSURE: 172 MMHG | OXYGEN SATURATION: 99 % | HEART RATE: 80 BPM

## 2019-04-29 LAB
ALBUMIN SERPL ELPH-MCNC: 4 G/DL — SIGNIFICANT CHANGE UP (ref 3.3–5)
ALP SERPL-CCNC: 47 U/L — SIGNIFICANT CHANGE UP (ref 40–120)
ALT FLD-CCNC: 15 U/L — SIGNIFICANT CHANGE UP (ref 4–33)
ANION GAP SERPL CALC-SCNC: 13 MMO/L — SIGNIFICANT CHANGE UP (ref 7–14)
APPEARANCE UR: CLEAR — SIGNIFICANT CHANGE UP
AST SERPL-CCNC: 21 U/L — SIGNIFICANT CHANGE UP (ref 4–32)
BASE EXCESS BLDV CALC-SCNC: -2.5 MMOL/L — SIGNIFICANT CHANGE UP
BASE EXCESS BLDV CALC-SCNC: 0.2 MMOL/L — SIGNIFICANT CHANGE UP
BASOPHILS # BLD AUTO: 0.02 K/UL — SIGNIFICANT CHANGE UP (ref 0–0.2)
BASOPHILS NFR BLD AUTO: 0.2 % — SIGNIFICANT CHANGE UP (ref 0–2)
BILIRUB SERPL-MCNC: 0.5 MG/DL — SIGNIFICANT CHANGE UP (ref 0.2–1.2)
BILIRUB UR-MCNC: NEGATIVE — SIGNIFICANT CHANGE UP
BLOOD GAS VENOUS - CREATININE: 0.68 MG/DL — SIGNIFICANT CHANGE UP (ref 0.5–1.3)
BLOOD GAS VENOUS - CREATININE: SIGNIFICANT CHANGE UP MG/DL (ref 0.5–1.3)
BLOOD UR QL VISUAL: NEGATIVE — SIGNIFICANT CHANGE UP
BUN SERPL-MCNC: 11 MG/DL — SIGNIFICANT CHANGE UP (ref 7–23)
CALCIUM SERPL-MCNC: 9.1 MG/DL — SIGNIFICANT CHANGE UP (ref 8.4–10.5)
CHLORIDE BLDV-SCNC: 90 MMOL/L — LOW (ref 96–108)
CHLORIDE BLDV-SCNC: 93 MMOL/L — LOW (ref 96–108)
CHLORIDE SERPL-SCNC: 86 MMOL/L — LOW (ref 98–107)
CO2 SERPL-SCNC: 23 MMOL/L — SIGNIFICANT CHANGE UP (ref 22–31)
COLOR SPEC: SIGNIFICANT CHANGE UP
CREAT SERPL-MCNC: 0.71 MG/DL — SIGNIFICANT CHANGE UP (ref 0.5–1.3)
EOSINOPHIL # BLD AUTO: 0.09 K/UL — SIGNIFICANT CHANGE UP (ref 0–0.5)
EOSINOPHIL NFR BLD AUTO: 0.7 % — SIGNIFICANT CHANGE UP (ref 0–6)
GAS PNL BLDV: 120 MMOL/L — CRITICAL LOW (ref 136–146)
GAS PNL BLDV: 124 MMOL/L — LOW (ref 136–146)
GLUCOSE BLDV-MCNC: 193 — HIGH (ref 70–99)
GLUCOSE BLDV-MCNC: 224 — HIGH (ref 70–99)
GLUCOSE SERPL-MCNC: 196 MG/DL — HIGH (ref 70–99)
GLUCOSE UR-MCNC: 70 — SIGNIFICANT CHANGE UP
HCO3 BLDV-SCNC: 22 MMOL/L — SIGNIFICANT CHANGE UP (ref 20–27)
HCO3 BLDV-SCNC: 24 MMOL/L — SIGNIFICANT CHANGE UP (ref 20–27)
HCT VFR BLD CALC: 36.3 % — SIGNIFICANT CHANGE UP (ref 34.5–45)
HCT VFR BLDV CALC: 35.3 % — SIGNIFICANT CHANGE UP (ref 34.5–45)
HCT VFR BLDV CALC: 41.1 % — SIGNIFICANT CHANGE UP (ref 34.5–45)
HGB BLD-MCNC: 11.9 G/DL — SIGNIFICANT CHANGE UP (ref 11.5–15.5)
HGB BLDV-MCNC: 11.4 G/DL — LOW (ref 11.5–15.5)
HGB BLDV-MCNC: 13.4 G/DL — SIGNIFICANT CHANGE UP (ref 11.5–15.5)
IMM GRANULOCYTES NFR BLD AUTO: 0.3 % — SIGNIFICANT CHANGE UP (ref 0–1.5)
KETONES UR-MCNC: NEGATIVE — SIGNIFICANT CHANGE UP
LACTATE BLDV-MCNC: 3.5 MMOL/L — HIGH (ref 0.5–2)
LACTATE BLDV-MCNC: 4.1 MMOL/L — CRITICAL HIGH (ref 0.5–2)
LEUKOCYTE ESTERASE UR-ACNC: NEGATIVE — SIGNIFICANT CHANGE UP
LYMPHOCYTES # BLD AUTO: 29 % — SIGNIFICANT CHANGE UP (ref 13–44)
LYMPHOCYTES # BLD AUTO: 3.52 K/UL — HIGH (ref 1–3.3)
MCHC RBC-ENTMCNC: 30.3 PG — SIGNIFICANT CHANGE UP (ref 27–34)
MCHC RBC-ENTMCNC: 32.8 % — SIGNIFICANT CHANGE UP (ref 32–36)
MCV RBC AUTO: 92.4 FL — SIGNIFICANT CHANGE UP (ref 80–100)
MONOCYTES # BLD AUTO: 0.9 K/UL — SIGNIFICANT CHANGE UP (ref 0–0.9)
MONOCYTES NFR BLD AUTO: 7.4 % — SIGNIFICANT CHANGE UP (ref 2–14)
NEUTROPHILS # BLD AUTO: 7.56 K/UL — HIGH (ref 1.8–7.4)
NEUTROPHILS NFR BLD AUTO: 62.4 % — SIGNIFICANT CHANGE UP (ref 43–77)
NITRITE UR-MCNC: NEGATIVE — SIGNIFICANT CHANGE UP
NRBC # FLD: 0 K/UL — SIGNIFICANT CHANGE UP (ref 0–0)
PCO2 BLDV: 39 MMHG — LOW (ref 41–51)
PCO2 BLDV: 45 MMHG — SIGNIFICANT CHANGE UP (ref 41–51)
PH BLDV: 7.32 PH — SIGNIFICANT CHANGE UP (ref 7.32–7.43)
PH BLDV: 7.41 PH — SIGNIFICANT CHANGE UP (ref 7.32–7.43)
PH UR: 7.5 — SIGNIFICANT CHANGE UP (ref 5–8)
PLATELET # BLD AUTO: 155 K/UL — SIGNIFICANT CHANGE UP (ref 150–400)
PMV BLD: 10.2 FL — SIGNIFICANT CHANGE UP (ref 7–13)
PO2 BLDV: 27 MMHG — LOW (ref 35–40)
PO2 BLDV: 47 MMHG — HIGH (ref 35–40)
POTASSIUM BLDV-SCNC: 4.4 MMOL/L — SIGNIFICANT CHANGE UP (ref 3.4–4.5)
POTASSIUM BLDV-SCNC: 4.8 MMOL/L — HIGH (ref 3.4–4.5)
POTASSIUM SERPL-MCNC: 4.8 MMOL/L — SIGNIFICANT CHANGE UP (ref 3.5–5.3)
POTASSIUM SERPL-SCNC: 4.8 MMOL/L — SIGNIFICANT CHANGE UP (ref 3.5–5.3)
PROT SERPL-MCNC: 7.4 G/DL — SIGNIFICANT CHANGE UP (ref 6–8.3)
PROT UR-MCNC: NEGATIVE — SIGNIFICANT CHANGE UP
RBC # BLD: 3.93 M/UL — SIGNIFICANT CHANGE UP (ref 3.8–5.2)
RBC # FLD: 11.8 % — SIGNIFICANT CHANGE UP (ref 10.3–14.5)
SAO2 % BLDV: 51.4 % — LOW (ref 60–85)
SAO2 % BLDV: 75.5 % — SIGNIFICANT CHANGE UP (ref 60–85)
SODIUM SERPL-SCNC: 122 MMOL/L — LOW (ref 135–145)
SP GR SPEC: 1.01 — SIGNIFICANT CHANGE UP (ref 1–1.04)
UROBILINOGEN FLD QL: NORMAL — SIGNIFICANT CHANGE UP
WBC # BLD: 12.13 K/UL — HIGH (ref 3.8–10.5)
WBC # FLD AUTO: 12.13 K/UL — HIGH (ref 3.8–10.5)

## 2019-04-29 PROCEDURE — 71045 X-RAY EXAM CHEST 1 VIEW: CPT | Mod: 26

## 2019-04-29 PROCEDURE — 74177 CT ABD & PELVIS W/CONTRAST: CPT | Mod: 26

## 2019-04-29 PROCEDURE — 70450 CT HEAD/BRAIN W/O DYE: CPT | Mod: 26

## 2019-04-29 RX ORDER — ACETAMINOPHEN 500 MG
1000 TABLET ORAL ONCE
Qty: 0 | Refills: 0 | Status: COMPLETED | OUTPATIENT
Start: 2019-04-29 | End: 2019-04-29

## 2019-04-29 RX ORDER — PIPERACILLIN AND TAZOBACTAM 4; .5 G/20ML; G/20ML
3.38 INJECTION, POWDER, LYOPHILIZED, FOR SOLUTION INTRAVENOUS ONCE
Qty: 0 | Refills: 0 | Status: COMPLETED | OUTPATIENT
Start: 2019-04-29 | End: 2019-04-29

## 2019-04-29 RX ORDER — SODIUM CHLORIDE 9 MG/ML
1000 INJECTION INTRAMUSCULAR; INTRAVENOUS; SUBCUTANEOUS ONCE
Qty: 0 | Refills: 0 | Status: COMPLETED | OUTPATIENT
Start: 2019-04-29 | End: 2019-04-29

## 2019-04-29 RX ORDER — SODIUM CHLORIDE 9 MG/ML
500 INJECTION INTRAMUSCULAR; INTRAVENOUS; SUBCUTANEOUS ONCE
Qty: 0 | Refills: 0 | Status: COMPLETED | OUTPATIENT
Start: 2019-04-29 | End: 2019-04-29

## 2019-04-29 RX ORDER — SODIUM CHLORIDE 9 MG/ML
250 INJECTION, SOLUTION INTRAVENOUS ONCE
Qty: 0 | Refills: 0 | Status: DISCONTINUED | OUTPATIENT
Start: 2019-04-29 | End: 2019-04-29

## 2019-04-29 RX ORDER — VANCOMYCIN HCL 1 G
1000 VIAL (EA) INTRAVENOUS ONCE
Qty: 0 | Refills: 0 | Status: COMPLETED | OUTPATIENT
Start: 2019-04-29 | End: 2019-04-29

## 2019-04-29 RX ADMIN — Medication 1000 MILLIGRAM(S): at 22:33

## 2019-04-29 RX ADMIN — SODIUM CHLORIDE 1000 MILLILITER(S): 9 INJECTION INTRAMUSCULAR; INTRAVENOUS; SUBCUTANEOUS at 22:33

## 2019-04-29 RX ADMIN — SODIUM CHLORIDE 500 MILLILITER(S): 9 INJECTION INTRAMUSCULAR; INTRAVENOUS; SUBCUTANEOUS at 23:55

## 2019-04-29 RX ADMIN — Medication 250 MILLIGRAM(S): at 20:12

## 2019-04-29 RX ADMIN — Medication 1000 MILLIGRAM(S): at 23:08

## 2019-04-29 RX ADMIN — PIPERACILLIN AND TAZOBACTAM 3.38 GRAM(S): 4; .5 INJECTION, POWDER, LYOPHILIZED, FOR SOLUTION INTRAVENOUS at 20:11

## 2019-04-29 RX ADMIN — SODIUM CHLORIDE 1000 MILLILITER(S): 9 INJECTION INTRAMUSCULAR; INTRAVENOUS; SUBCUTANEOUS at 23:08

## 2019-04-29 RX ADMIN — SODIUM CHLORIDE 2000 MILLILITER(S): 9 INJECTION INTRAMUSCULAR; INTRAVENOUS; SUBCUTANEOUS at 19:37

## 2019-04-29 RX ADMIN — Medication 400 MILLIGRAM(S): at 22:46

## 2019-04-29 RX ADMIN — PIPERACILLIN AND TAZOBACTAM 200 GRAM(S): 4; .5 INJECTION, POWDER, LYOPHILIZED, FOR SOLUTION INTRAVENOUS at 19:37

## 2019-04-29 NOTE — ED PROVIDER NOTE - PHYSICAL EXAMINATION
PHYSICAL EXAM:  GENERAL: no distress, alert, mostly cooperative, warm to touch   HEAD:  Atraumatic, Normocephalic  EYES: EOMI, PERRLA, conjunctiva and sclera clear, pupils 5 to 3  ENMT: No tonsillar erythema, exudates, or enlargement; Moist mucous membranes  NECK: Supple, No JVD,  HEART: Regular rate and rhythm; No murmurs, rubs, or gallops  RESPIRATORY: CTA B/L, No W/R/R  ABDOMEN: Soft, Nontender, Nondistended  NEURO: Alert, answers some questions, moving all 4 extremities, no apparent RUE weakness  EXTREMITIES:  2+ Peripheral Pulses, No clubbing, cyanosis, or edema  SKIN: warm, dry, normal color, no rash, no wounds

## 2019-04-29 NOTE — ED ADULT NURSE NOTE - NSIMPLEMENTINTERV_GEN_ALL_ED
Implemented All Fall with Harm Risk Interventions:  [place] to call system. Call bell, personal items and telephone within reach. Instruct patient to call for assistance. Room bathroom lighting operational. Non-slip footwear when patient is off stretcher. Physically safe environment: no spills, clutter or unnecessary equipment. Stretcher in lowest position, wheels locked, appropriate side rails in place. Provide visual cue, wrist band, yellow gown, etc. Monitor gait and stability. Monitor for mental status changes and reorient to person, place, and time. Review medications for side effects contributing to fall risk. Reinforce activity limits and safety measures with patient and family. Provide visual clues: red socks.

## 2019-04-29 NOTE — ED PROVIDER NOTE - OBJECTIVE STATEMENT
88 yoF hx HTN, HLD, IDDM presents to ED with AMS/fever. Initially concern for stroke code as patient was having RUE weakness, hx of prior CVA. After eval by ED team and neuro stroke team thought likely not to be CVA. Pt now moving RUE weakness. Febrile to 103 rectally and altered per family. Unable to obtain any history from patient.   Awaiting family in ED. 88 yoF hx HTN, HLD, IDDM presents to ED with AMS/fever. Initially concern for stroke code as patient was having RUE weakness, hx of prior CVA. After eval by ED team and neuro stroke team thought likely not to be CVA. Pt now moving RUE weakness. Febrile to 103 rectally and altered per family. Unable to obtain any history from patient.   Awaiting family in ED..

## 2019-04-29 NOTE — ED PROVIDER NOTE - CLINICAL SUMMARY MEDICAL DECISION MAKING FREE TEXT BOX
Daniel PGY1- 88 yoF, HTN, HLD, DM, prior CVA, presented with ?RUE weakness and AMS/fever. Stroke code initially called no focal deficit found. CTH normal. febrile rectally to 103, at this point will work up for febrile illness, AMS, no focal weakness, lungs cta, no wounds, heart rrr, abd soft,   labs, urine, cxr rvp, ns, broad abx coverage  likely admission eval or pna, uti, influenza, obtain more collateral info from family

## 2019-04-29 NOTE — ED ADULT NURSE REASSESSMENT NOTE - NS ED NURSE REASSESS COMMENT FT1
report received from jordan, pt A&Ox0, breathing even & unlabored, 20G IV in left upper arm infiltrated while vancomyocin and NS were running, both discontinued, IV removed, area is red and raised, heat packs applied, MD at bedside, no further orders at this time. 20G I Vplaced in L hand, NS and Vancomycin running as per MD orders, will continue to monitor.

## 2019-04-29 NOTE — ED ADULT NURSE NOTE - OBJECTIVE STATEMENT
Pt 's son brought pt to ED s/p AMS approx 2 hrs ago. pt w hx of stroke unclear which side effected. Code stroke called. No TPA as per neuro. Pt febrile and sepsis workup to be completed. PIV inserted, labs drawn as ordered. Straight Cath w sterile technique w RN omer. EKg done. Report given to primary RN.

## 2019-04-29 NOTE — ED PROVIDER NOTE - PROGRESS NOTE DETAILS
on reassessment, pt still appears confused, repeatedly asking staff to 'help me.' labs show hyponatremia, elevated lactate. on repeat exam, has large ventral hernia in lower abdomen, w/ tenderness over hernia, will order CT to eval and admit.  -Olvin [email or web address]

## 2019-04-29 NOTE — STROKE CODE NOTE - ABSOLUTE EXCLUSION OTHER CRITERIA
febrile with non focal exam febrile with non focal exam, unclear last known normal, although it seems the patient had onset of symptoms at 14:30, last time she was seen normal is not clear, no family is around to answer the question

## 2019-04-29 NOTE — CONSULT NOTE ADULT - ASSESSMENT
89 yo f with PMHx of HTN, HLD, IDDM2 presented with possible onset of confusion, not recognizing family around 2 pm, last known normal is unknown as the family is not here. She was brought to ED due to acute confusion. Code stroke was called. She is febrile, does not give any appropriate answers. V/S she is febrile in exam she  repeats "help me", sometimes says yes, closes eyes when not prompted, lethargic, does not follow commands, no facial droop, moves BL UE symmetrically, resists against passive movement, withdraws to pain, Does not move either LE, withdraws to pain, does not hold against gravity.  CTH showed significant white matter changes. NIHSS 9, Pre-MRS unknown  Impression: unknown last known normal, lethargy with non focal findings as well as fever point to infectious causes of generalized encephalopathy  Plan:  [] infectious/metabolic work up  {} CTA H&N  [] MRI brain with contrast if no sources were found  [] Can consider EEG after MRI is done 87 yo f with PMHx of HTN, HLD, IDDM2 presented with possible onset of confusion, not recognizing family around 2 pm, last known normal is unknown as the family is not here. She was brought to ED due to acute confusion. Code stroke was called. She is febrile, does not give any appropriate answers. V/S she is febrile in exam she  repeats "help me", sometimes says yes, closes eyes when not prompted, lethargic, does not follow commands, no facial droop, moves BL UE symmetrically, resists against passive movement, withdraws to pain, Does not move either LE, withdraws to pain, does not hold against gravity.  CTH showed significant white matter changes. NIHSS 9, Pre-MRS unknown  Impression: unclear last known normal, although it seems the patient had onset of symptoms at 14:30, last time she was seen normal is not clear, no family is around to answer the question. lethargy with non focal findings as well as fever point to infectious causes of generalized encephalopathy  Plan:  [] infectious/metabolic work up  {} CTA H&N  [] MRI brain with contrast if no sources were found  [] Can consider EEG after MRI is done 87 yo f with PMHx of HTN, HLD, IDDM2 presented with possible onset of confusion, not recognizing family around 2 pm, last known normal is unknown as the family is not here. She was brought to ED due to acute confusion. Code stroke was called. She is febrile, does not give any appropriate answers. V/S she is febrile in exam she  repeats "help me", sometimes says yes, closes eyes when not prompted, lethargic, does not follow commands, no facial droop, moves BL UE symmetrically, resists against passive movement, withdraws to pain, Does not move either LE, withdraws to pain, does not hold against gravity.  CTH showed significant white matter changes. NIHSS 9, Pre-MRS unknown  Impression: unclear last known normal, although it seems the patient had onset of symptoms at 14:30, last time she was seen normal is not clear, no family is around to answer the question. lethargy with non focal findings as well as fever point to infectious causes of generalized encephalopathy although cant rule out dominant parietal lobe strokes  Plan:  [] infectious/metabolic work up  [] CTA H&N  [] MRI brain with contrast if no sources were found  [] Can consider EEG after MRI is done 87 yo f with PMHx of HTN, HLD, IDDM2 presented with possible onset of confusion, not recognizing family around 2 pm, last known normal is unknown as the family is not here. She was brought to ED due to acute confusion. Code stroke was called. She is febrile, does not give any appropriate answers. V/S she is febrile in exam she  repeats "help me", sometimes says yes, closes eyes when not prompted, lethargic, does not follow commands, no facial droop, moves BL UE symmetrically, resists against passive movement, withdraws to pain, Does not move either LE, withdraws to pain, does not hold against gravity.  CTH showed significant white matter changes. NIHSS 9, Pre-MRS unknown  Impression: unclear last known normal, although it seems the patient had onset of symptoms at 14:30, last time she was seen normal is not clear, no family is around to answer the question. lethargy with non focal findings as well as fever point to infectious causes of generalized encephalopathy although cant rule out dominant parietal lobe strokes    Plan:  [] infectious/metabolic work up  [] MRI brain with contrast if no underlying  sources re found  [] Can consider EEG after MRI is done

## 2019-04-29 NOTE — ED PROVIDER NOTE - ATTENDING CONTRIBUTION TO CARE
Attending note:   After face to face evaluation of this patient, I concur with above noted hx, pe, and care plan for this patient.  89 y/o F with dm with confusion noted this afternoon by family.   +Febrile,   emergent ct negative for cva after code stroke called at registration.   No focal weakness.    Evaluation in progress.

## 2019-04-29 NOTE — CONSULT NOTE ADULT - SUBJECTIVE AND OBJECTIVE BOX
HPI:  87 yo f with PMHx of HTN, HLD, IDDM2 presented with possible onset of confusion, not recognizing family around 2 pm, last known normal is unknown as the family is not here. She was brought to ED due to acute confusion. Code stroke was called. She is febrile, does not give any appropriate answers.     MEDICATIONS  (STANDING):    MEDICATIONS  (PRN):    PAST MEDICAL & SURGICAL HISTORY:  HTN - Hypertension  Hypercholesterolemia  Diabetes mellitus  S/P umbilical hernia repair, follow-up exam  S/P cholecystectomy    FAMILY HISTORY:  No pertinent family history in first degree relatives    Allergies    naproxen (Nausea)    Intolerances    SHx - No smoking, No ETOH, No drug abuse    Review of Systems:    NEUROLOGICAL: See HPI    Vital Signs Last 24 Hrs  T(C): 37.9 (29 Apr 2019 17:55), Max: 37.9 (29 Apr 2019 17:55)  T(F): 100.2 (29 Apr 2019 17:55), Max: 100.2 (29 Apr 2019 17:55)  HR: 80 (29 Apr 2019 17:55) (80 - 80)  BP: 172/92 (29 Apr 2019 17:55) (172/92 - 172/92)  BP(mean): --  RR: 24 (29 Apr 2019 17:55) (24 - 24)  SpO2: 99% (29 Apr 2019 17:55) (99% - 99%)    General Exam:   General appearance: No acute distress                   Neurological Exam:  Mental Status: eyes open, does not answer any question, repeats "help me", sometimes says yes, closes eyes when not prompted, lethargic  does not follow commands, WILLI, EOMs appear to be intact, does not blink to threat on either side, no facial droop  moves BL UE symmetrically, resists against passive movement, withdraws to pain  Does not move either LE, withdraws to pain, does not hold against gravity    No facial asymmetry.      Other:              Radiology    CT  MRI  EKG:  tele:  TTE:  EEG:

## 2019-04-29 NOTE — ED ADULT TRIAGE NOTE - CHIEF COMPLAINT QUOTE
pt BIBA from home, pt c/o right sided weakness and AMS x 2 hours.  as per family, this is how she presented for her previous CVA.  FIT eval in progress.

## 2019-04-30 DIAGNOSIS — A41.9 SEPSIS, UNSPECIFIED ORGANISM: ICD-10-CM

## 2019-04-30 DIAGNOSIS — I10 ESSENTIAL (PRIMARY) HYPERTENSION: ICD-10-CM

## 2019-04-30 DIAGNOSIS — E11.8 TYPE 2 DIABETES MELLITUS WITH UNSPECIFIED COMPLICATIONS: ICD-10-CM

## 2019-04-30 DIAGNOSIS — E78.5 HYPERLIPIDEMIA, UNSPECIFIED: ICD-10-CM

## 2019-04-30 DIAGNOSIS — R79.89 OTHER SPECIFIED ABNORMAL FINDINGS OF BLOOD CHEMISTRY: ICD-10-CM

## 2019-04-30 DIAGNOSIS — Z79.899 OTHER LONG TERM (CURRENT) DRUG THERAPY: ICD-10-CM

## 2019-04-30 DIAGNOSIS — R50.9 FEVER, UNSPECIFIED: ICD-10-CM

## 2019-04-30 DIAGNOSIS — Z29.9 ENCOUNTER FOR PROPHYLACTIC MEASURES, UNSPECIFIED: ICD-10-CM

## 2019-04-30 DIAGNOSIS — G93.40 ENCEPHALOPATHY, UNSPECIFIED: ICD-10-CM

## 2019-04-30 DIAGNOSIS — E87.1 HYPO-OSMOLALITY AND HYPONATREMIA: ICD-10-CM

## 2019-04-30 LAB
ANION GAP SERPL CALC-SCNC: 16 MMO/L — HIGH (ref 7–14)
ANION GAP SERPL CALC-SCNC: 16 MMO/L — HIGH (ref 7–14)
B PERT DNA SPEC QL NAA+PROBE: NOT DETECTED — SIGNIFICANT CHANGE UP
B-OH-BUTYR SERPL-SCNC: 0.1 MMOL/L — SIGNIFICANT CHANGE UP (ref 0–0.4)
BASE EXCESS BLDV CALC-SCNC: -1.8 MMOL/L — SIGNIFICANT CHANGE UP
BASOPHILS # BLD AUTO: 0.01 K/UL — SIGNIFICANT CHANGE UP (ref 0–0.2)
BASOPHILS NFR BLD AUTO: 0.1 % — SIGNIFICANT CHANGE UP (ref 0–2)
BLOOD GAS VENOUS - CREATININE: 0.72 MG/DL — SIGNIFICANT CHANGE UP (ref 0.5–1.3)
BUN SERPL-MCNC: 8 MG/DL — SIGNIFICANT CHANGE UP (ref 7–23)
BUN SERPL-MCNC: 9 MG/DL — SIGNIFICANT CHANGE UP (ref 7–23)
C PNEUM DNA SPEC QL NAA+PROBE: NOT DETECTED — SIGNIFICANT CHANGE UP
CALCIUM SERPL-MCNC: 8.6 MG/DL — SIGNIFICANT CHANGE UP (ref 8.4–10.5)
CALCIUM SERPL-MCNC: 8.9 MG/DL — SIGNIFICANT CHANGE UP (ref 8.4–10.5)
CHLORIDE BLDV-SCNC: 97 MMOL/L — SIGNIFICANT CHANGE UP (ref 96–108)
CHLORIDE SERPL-SCNC: 91 MMOL/L — LOW (ref 98–107)
CHLORIDE SERPL-SCNC: 93 MMOL/L — LOW (ref 98–107)
CHLORIDE UR-SCNC: 68 MMOL/L — SIGNIFICANT CHANGE UP
CO2 SERPL-SCNC: 19 MMOL/L — LOW (ref 22–31)
CO2 SERPL-SCNC: 22 MMOL/L — SIGNIFICANT CHANGE UP (ref 22–31)
CREAT ?TM UR-MCNC: 12.8 MG/DL — SIGNIFICANT CHANGE UP
CREAT SERPL-MCNC: 0.7 MG/DL — SIGNIFICANT CHANGE UP (ref 0.5–1.3)
CREAT SERPL-MCNC: 0.81 MG/DL — SIGNIFICANT CHANGE UP (ref 0.5–1.3)
EOSINOPHIL # BLD AUTO: 0.06 K/UL — SIGNIFICANT CHANGE UP (ref 0–0.5)
EOSINOPHIL NFR BLD AUTO: 0.6 % — SIGNIFICANT CHANGE UP (ref 0–6)
FLUAV H1 2009 PAND RNA SPEC QL NAA+PROBE: NOT DETECTED — SIGNIFICANT CHANGE UP
FLUAV H1 RNA SPEC QL NAA+PROBE: NOT DETECTED — SIGNIFICANT CHANGE UP
FLUAV H3 RNA SPEC QL NAA+PROBE: NOT DETECTED — SIGNIFICANT CHANGE UP
FLUAV SUBTYP SPEC NAA+PROBE: NOT DETECTED — SIGNIFICANT CHANGE UP
FLUBV RNA SPEC QL NAA+PROBE: NOT DETECTED — SIGNIFICANT CHANGE UP
GAS PNL BLDV: 122 MMOL/L — LOW (ref 136–146)
GLUCOSE BLDV-MCNC: 227 — HIGH (ref 70–99)
GLUCOSE SERPL-MCNC: 213 MG/DL — HIGH (ref 70–99)
GLUCOSE SERPL-MCNC: 218 MG/DL — HIGH (ref 70–99)
HADV DNA SPEC QL NAA+PROBE: NOT DETECTED — SIGNIFICANT CHANGE UP
HCO3 BLDV-SCNC: 22 MMOL/L — SIGNIFICANT CHANGE UP (ref 20–27)
HCOV PNL SPEC NAA+PROBE: SIGNIFICANT CHANGE UP
HCT VFR BLD CALC: 37.3 % — SIGNIFICANT CHANGE UP (ref 34.5–45)
HCT VFR BLDV CALC: 38 % — SIGNIFICANT CHANGE UP (ref 34.5–45)
HGB BLD-MCNC: 12.4 G/DL — SIGNIFICANT CHANGE UP (ref 11.5–15.5)
HGB BLDV-MCNC: 12.4 G/DL — SIGNIFICANT CHANGE UP (ref 11.5–15.5)
HMPV RNA SPEC QL NAA+PROBE: NOT DETECTED — SIGNIFICANT CHANGE UP
HPIV1 RNA SPEC QL NAA+PROBE: NOT DETECTED — SIGNIFICANT CHANGE UP
HPIV2 RNA SPEC QL NAA+PROBE: NOT DETECTED — SIGNIFICANT CHANGE UP
HPIV3 RNA SPEC QL NAA+PROBE: NOT DETECTED — SIGNIFICANT CHANGE UP
HPIV4 RNA SPEC QL NAA+PROBE: NOT DETECTED — SIGNIFICANT CHANGE UP
IMM GRANULOCYTES NFR BLD AUTO: 0.3 % — SIGNIFICANT CHANGE UP (ref 0–1.5)
LACTATE BLDV-MCNC: 4.3 MMOL/L — CRITICAL HIGH (ref 0.5–2)
LACTATE SERPL-SCNC: 2.2 MMOL/L — HIGH (ref 0.5–2)
LYMPHOCYTES # BLD AUTO: 2.95 K/UL — SIGNIFICANT CHANGE UP (ref 1–3.3)
LYMPHOCYTES # BLD AUTO: 30 % — SIGNIFICANT CHANGE UP (ref 13–44)
MAGNESIUM SERPL-MCNC: 1.5 MG/DL — LOW (ref 1.6–2.6)
MCHC RBC-ENTMCNC: 30.1 PG — SIGNIFICANT CHANGE UP (ref 27–34)
MCHC RBC-ENTMCNC: 33.2 % — SIGNIFICANT CHANGE UP (ref 32–36)
MCV RBC AUTO: 90.5 FL — SIGNIFICANT CHANGE UP (ref 80–100)
MONOCYTES # BLD AUTO: 0.95 K/UL — HIGH (ref 0–0.9)
MONOCYTES NFR BLD AUTO: 9.7 % — SIGNIFICANT CHANGE UP (ref 2–14)
NEUTROPHILS # BLD AUTO: 5.82 K/UL — SIGNIFICANT CHANGE UP (ref 1.8–7.4)
NEUTROPHILS NFR BLD AUTO: 59.3 % — SIGNIFICANT CHANGE UP (ref 43–77)
NRBC # FLD: 0 K/UL — SIGNIFICANT CHANGE UP (ref 0–0)
OSMOLALITY SERPL: 281 MOSMO/KG — SIGNIFICANT CHANGE UP (ref 275–295)
OSMOLALITY UR: 233 MOSMO/KG — SIGNIFICANT CHANGE UP (ref 50–1200)
PCO2 BLDV: 44 MMHG — SIGNIFICANT CHANGE UP (ref 41–51)
PH BLDV: 7.34 PH — SIGNIFICANT CHANGE UP (ref 7.32–7.43)
PHOSPHATE SERPL-MCNC: 3.4 MG/DL — SIGNIFICANT CHANGE UP (ref 2.5–4.5)
PLATELET # BLD AUTO: 213 K/UL — SIGNIFICANT CHANGE UP (ref 150–400)
PMV BLD: 9.3 FL — SIGNIFICANT CHANGE UP (ref 7–13)
PO2 BLDV: 33 MMHG — LOW (ref 35–40)
POTASSIUM BLDV-SCNC: 4.9 MMOL/L — HIGH (ref 3.4–4.5)
POTASSIUM SERPL-MCNC: 4.6 MMOL/L — SIGNIFICANT CHANGE UP (ref 3.5–5.3)
POTASSIUM SERPL-MCNC: 5 MMOL/L — SIGNIFICANT CHANGE UP (ref 3.5–5.3)
POTASSIUM SERPL-SCNC: 4.6 MMOL/L — SIGNIFICANT CHANGE UP (ref 3.5–5.3)
POTASSIUM SERPL-SCNC: 5 MMOL/L — SIGNIFICANT CHANGE UP (ref 3.5–5.3)
RBC # BLD: 4.12 M/UL — SIGNIFICANT CHANGE UP (ref 3.8–5.2)
RBC # FLD: 11.8 % — SIGNIFICANT CHANGE UP (ref 10.3–14.5)
RSV RNA SPEC QL NAA+PROBE: NOT DETECTED — SIGNIFICANT CHANGE UP
RV+EV RNA SPEC QL NAA+PROBE: NOT DETECTED — SIGNIFICANT CHANGE UP
SAO2 % BLDV: 55.8 % — LOW (ref 60–85)
SODIUM SERPL-SCNC: 126 MMOL/L — LOW (ref 135–145)
SODIUM SERPL-SCNC: 131 MMOL/L — LOW (ref 135–145)
SODIUM UR-SCNC: 76 MMOL/L — SIGNIFICANT CHANGE UP
SPECIMEN SOURCE: SIGNIFICANT CHANGE UP
SPECIMEN SOURCE: SIGNIFICANT CHANGE UP
WBC # BLD: 9.82 K/UL — SIGNIFICANT CHANGE UP (ref 3.8–10.5)
WBC # FLD AUTO: 9.82 K/UL — SIGNIFICANT CHANGE UP (ref 3.8–10.5)

## 2019-04-30 PROCEDURE — 99222 1ST HOSP IP/OBS MODERATE 55: CPT | Mod: GC

## 2019-04-30 PROCEDURE — 99223 1ST HOSP IP/OBS HIGH 75: CPT

## 2019-04-30 RX ORDER — SODIUM CHLORIDE 9 MG/ML
1000 INJECTION, SOLUTION INTRAVENOUS
Qty: 0 | Refills: 0 | Status: DISCONTINUED | OUTPATIENT
Start: 2019-04-30 | End: 2019-05-03

## 2019-04-30 RX ORDER — ERGOCALCIFEROL 1.25 MG/1
1 CAPSULE ORAL
Qty: 0 | Refills: 0 | COMMUNITY

## 2019-04-30 RX ORDER — ACETAMINOPHEN 500 MG
650 TABLET ORAL ONCE
Qty: 0 | Refills: 0 | Status: COMPLETED | OUTPATIENT
Start: 2019-04-30 | End: 2019-04-30

## 2019-04-30 RX ORDER — ATORVASTATIN CALCIUM 80 MG/1
10 TABLET, FILM COATED ORAL AT BEDTIME
Qty: 0 | Refills: 0 | Status: DISCONTINUED | OUTPATIENT
Start: 2019-04-30 | End: 2019-05-03

## 2019-04-30 RX ORDER — LOSARTAN POTASSIUM 100 MG/1
1 TABLET, FILM COATED ORAL
Qty: 0 | Refills: 0 | COMMUNITY

## 2019-04-30 RX ORDER — PIPERACILLIN AND TAZOBACTAM 4; .5 G/20ML; G/20ML
3.38 INJECTION, POWDER, LYOPHILIZED, FOR SOLUTION INTRAVENOUS EVERY 8 HOURS
Qty: 0 | Refills: 0 | Status: DISCONTINUED | OUTPATIENT
Start: 2019-04-30 | End: 2019-05-03

## 2019-04-30 RX ORDER — VALSARTAN 80 MG/1
1 TABLET ORAL
Qty: 0 | Refills: 0 | COMMUNITY

## 2019-04-30 RX ORDER — INSULIN LISPRO 100/ML
VIAL (ML) SUBCUTANEOUS AT BEDTIME
Qty: 0 | Refills: 0 | Status: DISCONTINUED | OUTPATIENT
Start: 2019-04-30 | End: 2019-05-03

## 2019-04-30 RX ORDER — GLUCAGON INJECTION, SOLUTION 0.5 MG/.1ML
1 INJECTION, SOLUTION SUBCUTANEOUS ONCE
Qty: 0 | Refills: 0 | Status: DISCONTINUED | OUTPATIENT
Start: 2019-04-30 | End: 2019-05-03

## 2019-04-30 RX ORDER — INSULIN GLARGINE 100 [IU]/ML
14 INJECTION, SOLUTION SUBCUTANEOUS
Qty: 0 | Refills: 0 | COMMUNITY

## 2019-04-30 RX ORDER — AZITHROMYCIN 500 MG/1
500 TABLET, FILM COATED ORAL ONCE
Qty: 0 | Refills: 0 | Status: COMPLETED | OUTPATIENT
Start: 2019-04-30 | End: 2019-04-30

## 2019-04-30 RX ORDER — DEXTROSE 50 % IN WATER 50 %
12.5 SYRINGE (ML) INTRAVENOUS ONCE
Qty: 0 | Refills: 0 | Status: DISCONTINUED | OUTPATIENT
Start: 2019-04-30 | End: 2019-05-03

## 2019-04-30 RX ORDER — AZITHROMYCIN 500 MG/1
TABLET, FILM COATED ORAL
Qty: 0 | Refills: 0 | Status: DISCONTINUED | OUTPATIENT
Start: 2019-04-30 | End: 2019-05-03

## 2019-04-30 RX ORDER — DEXTROSE 50 % IN WATER 50 %
15 SYRINGE (ML) INTRAVENOUS ONCE
Qty: 0 | Refills: 0 | Status: DISCONTINUED | OUTPATIENT
Start: 2019-04-30 | End: 2019-05-03

## 2019-04-30 RX ORDER — INSULIN LISPRO 100/ML
VIAL (ML) SUBCUTANEOUS
Qty: 0 | Refills: 0 | Status: DISCONTINUED | OUTPATIENT
Start: 2019-04-30 | End: 2019-05-03

## 2019-04-30 RX ORDER — LOSARTAN POTASSIUM 100 MG/1
100 TABLET, FILM COATED ORAL DAILY
Qty: 0 | Refills: 0 | Status: DISCONTINUED | OUTPATIENT
Start: 2019-04-30 | End: 2019-05-03

## 2019-04-30 RX ORDER — DEXTROSE 50 % IN WATER 50 %
25 SYRINGE (ML) INTRAVENOUS ONCE
Qty: 0 | Refills: 0 | Status: DISCONTINUED | OUTPATIENT
Start: 2019-04-30 | End: 2019-05-03

## 2019-04-30 RX ORDER — FOLIC ACID 0.8 MG
1 TABLET ORAL
Qty: 0 | Refills: 0 | COMMUNITY

## 2019-04-30 RX ORDER — AZITHROMYCIN 500 MG/1
500 TABLET, FILM COATED ORAL EVERY 24 HOURS
Qty: 0 | Refills: 0 | Status: DISCONTINUED | OUTPATIENT
Start: 2019-05-01 | End: 2019-05-03

## 2019-04-30 RX ORDER — HYDRALAZINE HCL 50 MG
10 TABLET ORAL ONCE
Qty: 0 | Refills: 0 | Status: COMPLETED | OUTPATIENT
Start: 2019-04-30 | End: 2019-04-30

## 2019-04-30 RX ORDER — PREGABALIN 225 MG/1
1 CAPSULE ORAL
Qty: 0 | Refills: 0 | COMMUNITY

## 2019-04-30 RX ORDER — ENOXAPARIN SODIUM 100 MG/ML
12 INJECTION SUBCUTANEOUS
Qty: 0 | Refills: 0 | COMMUNITY

## 2019-04-30 RX ADMIN — PIPERACILLIN AND TAZOBACTAM 25 GRAM(S): 4; .5 INJECTION, POWDER, LYOPHILIZED, FOR SOLUTION INTRAVENOUS at 22:50

## 2019-04-30 RX ADMIN — LOSARTAN POTASSIUM 100 MILLIGRAM(S): 100 TABLET, FILM COATED ORAL at 06:08

## 2019-04-30 RX ADMIN — PIPERACILLIN AND TAZOBACTAM 25 GRAM(S): 4; .5 INJECTION, POWDER, LYOPHILIZED, FOR SOLUTION INTRAVENOUS at 14:25

## 2019-04-30 RX ADMIN — AZITHROMYCIN 250 MILLIGRAM(S): 500 TABLET, FILM COATED ORAL at 05:05

## 2019-04-30 RX ADMIN — Medication 1: at 22:53

## 2019-04-30 RX ADMIN — SODIUM CHLORIDE 2000 MILLILITER(S): 9 INJECTION INTRAMUSCULAR; INTRAVENOUS; SUBCUTANEOUS at 00:08

## 2019-04-30 RX ADMIN — Medication 2: at 18:58

## 2019-04-30 RX ADMIN — Medication 10 MILLIGRAM(S): at 20:06

## 2019-04-30 RX ADMIN — PIPERACILLIN AND TAZOBACTAM 25 GRAM(S): 4; .5 INJECTION, POWDER, LYOPHILIZED, FOR SOLUTION INTRAVENOUS at 06:48

## 2019-04-30 RX ADMIN — SODIUM CHLORIDE 1000 MILLILITER(S): 9 INJECTION INTRAMUSCULAR; INTRAVENOUS; SUBCUTANEOUS at 01:26

## 2019-04-30 RX ADMIN — Medication 650 MILLIGRAM(S): at 20:06

## 2019-04-30 RX ADMIN — ATORVASTATIN CALCIUM 10 MILLIGRAM(S): 80 TABLET, FILM COATED ORAL at 22:50

## 2019-04-30 RX ADMIN — Medication 1000 MILLIGRAM(S): at 01:26

## 2019-04-30 RX ADMIN — Medication 1: at 09:30

## 2019-04-30 RX ADMIN — Medication 2: at 14:19

## 2019-04-30 NOTE — H&P ADULT - PROBLEM SELECTOR PLAN 7
- As per OMR, she was recently prescribed pravastatin 40mg daily, will place on therapeutic interchange for pravastatin and clarify her meds in AM

## 2019-04-30 NOTE — H&P ADULT - PROBLEM SELECTOR PLAN 3
- Noted to have hyponatremia of unclear etiology  - Received 2.5L of NS, will hold off on further IVF for now  - Will check BMP in AM to trend her sodium, will also check a serum osm, urine osm, urine sodium, and urine chloride

## 2019-04-30 NOTE — ED ADULT NURSE REASSESSMENT NOTE - NS ED NURSE REASSESS COMMENT FT1
pt A&Ox1, alert & oriented to self only, answers some questions appropriately, breathing even & unlabored, denies n/v/d, dizziness, cp, sob. Linens & diaper changed, comfort measures provided, side rails up, awaiting MICU consult, will continue to monitor. pt A&Ox1, alert & oriented to self only, answers some questions appropriately, breathing even & unlabored, skin intact, denies n/v/d, dizziness, cp, sob. Linens & diaper changed, states she "just wants to go home", comfort measures provided, side rails up, awaiting MICU consult, will continue to monitor.

## 2019-04-30 NOTE — H&P ADULT - HISTORY OF PRESENT ILLNESS
This is an 88F with history of HTN, HLD, DM2, and TIA who presents to the hospital with altered mental status x1 day. Patient currently is AAO x1, unable to fully give history, mostly stating her preference to go home and that she is 88 years old (though she does answer pointed questions appropriately). As per family (spoke with son Mikie [phone]) patient was in her usual state of health this AM but around 1030AM they had noticed that she was having L arm weakness. A little while later, they had noted that the patient became confused and was not recognizing any of the family members and that she was speaking nonsense. As per the son, the patient had a similar presentation when she had her TIA and the family was worried that she was having another TIA or stroke and therefore brought her to the hospital for eval. The family denies noticing any infectious complaints on the patient or any fevers/chills but the patient states that she has been having a mildly productive cough with nasal congestion and R temporal [place]. She denies any neck stiffness, light/sound sensitivity, nausea/vomiting, abd pain, back pain, dysuria, or extremity/joint pain. No other complaints. As per the son, the patient at baseline is very functional, is able to take care of her bills and living expenditure by herself without any need for help from others.    On arrival to the ED, her vitals were T 100.2 -> 101.2 (rectal), P 80, /92, RR 24, O2 sat 99% RA. Her lab work showed mild leukocytosis, hyponatremia, and an elevated blood lactate that failed to resolve despite significant IVF. She was given vancomycin 1Gg IVPB x1, Zosyn 3.375G IVPB x1, Tylenol 1G IVPB x1, and NS 2.5L. Her mental status initially on arrival to the ED was very poor but it improved during her ED stay but was still not back to her usual baseline. This is an 88F with history of HTN, HLD, DM2, and TIA who presents to the hospital with altered mental status x1 day. Patient currently is AAO x1, unable to fully give history, mostly stating her preference to go home and that she is 88 years old (though she does answer pointed questions appropriately). As per family (spoke with son Mikie [phone]) patient was in her usual state of health this AM but around 1030AM they had noticed that she was having L arm weakness. A little while later, they had noted that the patient became confused and was not recognizing any of the family members and that she was speaking nonsense. As per the son, the patient had a similar presentation when she had her TIA and the family was worried that she was having another TIA or stroke and therefore brought her to the hospital for eval. The family denies noticing any infectious complaints on the patient or any fevers/chills but the patient states that she has been having a mildly productive cough with nasal congestion and R temporal [place]. She denies any neck stiffness, light/sound sensitivity, nausea/vomiting, abd pain, back pain, dysuria, or extremity/joint pain. No other complaints. As per the son, the patient at baseline is very functional, is able to take care of her bills and living expenditure by herself without any need for help from others.    On arrival to the ED, her vitals were T 100.2 -> 101.2 (rectal), P 80, /92, RR 24, O2 sat 99% RA. Her lab work showed mild leukocytosis, hyponatremia, and an elevated blood lactate that failed to resolve despite significant IVF. She was given vancomycin 1Gg IVPB x1, Zosyn 3.375G IVPB x1, Tylenol 1G IVPB x1, and NS 2.5L. Her mental status initially on arrival to the ED was very poor but it improved during her ED stay though it was still not back to her usual baseline.

## 2019-04-30 NOTE — H&P ADULT - NSHPREVIEWOFSYSTEMS_GEN_ALL_CORE
REVIEW OF SYSTEMS:    CONSTITUTIONAL: No weakness, fevers or chills  EYES: No visual changes or eye discharge  ENT: +rhinorrhea, +sore throat, No ear pain/discharge, no sinus pain  NECK: No pain or stiffness  RESPIRATORY: +cough with mild sputum production; No shortness of breath  CARDIOVASCULAR: No chest pain or palpitations; No lower extremity edema  GASTROINTESTINAL: No abdominal or epigastric pain. No nausea, vomiting, or hematemesis; No diarrhea or constipation. No melena or hematochezia.  BACK: No back pain, no flank pain  GENITOURINARY: No dysuria, frequency or hematuria  NEUROLOGICAL: No HA; No numbness or weakness  SKIN: No itching, burning, rashes, or lesions

## 2019-04-30 NOTE — H&P ADULT - ASSESSMENT
This is an 88F with history as above who presents to the hospital with AMS likely 2/2 metabolic causes.

## 2019-04-30 NOTE — H&P ADULT - PROBLEM SELECTOR PLAN 4
- Noted to have a non-resolving lactate but her BPs have not been hypotensive or soft, therefore doubt her lactic acidosis is hemodynamically mediated and suspect it is likely 2/2 metformin use (especially since patient seems to have prior non-resolving lactate levels while not being hypotensive during those hospitalizations)  - Will monitor lactate for now - Noted to have a non-resolving lactate but her BPs have not been hypotensive or soft, therefore doubt her lactic acidosis is hemodynamically mediated and suspect it is likely 2/2 metformin use (especially since patient seems to have prior non-resolving lactate levels)  - Will monitor lactate for now in AM

## 2019-04-30 NOTE — H&P ADULT - PROBLEM SELECTOR PLAN 5
- BP elevated on arrival  - As per OMR, patient was recently prescribed losartan 100mg daily, will c/w this for now and clarify full med rec in AM

## 2019-04-30 NOTE — H&P ADULT - NSHPPHYSICALEXAM_GEN_ALL_CORE
Vital Signs Last 24 Hrs  T(C): 36.8 (30 Apr 2019 00:36), Max: 38.4 (29 Apr 2019 18:50)  T(F): 98.2 (30 Apr 2019 00:36), Max: 101.2 (29 Apr 2019 18:50)  HR: 79 (30 Apr 2019 03:46) (79 - 95)  BP: 179/89 (30 Apr 2019 03:46) (168/83 - 184/78)  BP(mean): --  RR: 18 (30 Apr 2019 03:46) (18 - 24)  SpO2: 96% (30 Apr 2019 03:46) (95% - 100%)    GENERAL: No acute distress, well-developed  ENT: EOMI, PERRL, conjunctiva and sclera clear, Neck supple, No JVD, moist mucosa  CHEST/LUNG: Clear to auscultation bilaterally; No wheeze, equal breath sounds bilaterally   BACK: No spinal tenderness, no CVA tenderness  HEART: Regular rate and rhythm; No murmurs, rubs, or gallops  ABDOMEN: Soft, Nontender, Nondistended; Bowel sounds present  EXTREMITIES:  No clubbing, cyanosis, or edema  PSYCH: Nl behavior, nl affect  NEUROLOGY: AAOx1 (to self only currently, not oriented to time or place), non-focal, 5/5 strength b/l UE and LE, sensation intact to light touch upper extremity and lower extremity, face symmetric, tongue midline, speech fluent  SKIN: Normal color, No rashes or lesions

## 2019-04-30 NOTE — ED ADULT NURSE REASSESSMENT NOTE - NS ED NURSE REASSESS COMMENT FT1
Report received by night RN. Pt A&Ox3 at this time, resting comfortably in bed, denies any pain/ symptoms at this time, pt stable, in NAD, will continue to monitor.

## 2019-04-30 NOTE — ED ADULT NURSE REASSESSMENT NOTE - NS ED NURSE REASSESS COMMENT FT1
pt a&Ox1, 20G in R hand removed due to not be able to flush, no infiltration, 20G I vplaced in upper R arm, zosyn running as per MD order, labs & urine sent, pt cleaned & diaper changed, pt appears comfortable sitting up in bed, will continue to monitor.

## 2019-04-30 NOTE — H&P ADULT - PROBLEM SELECTOR PLAN 2
- Patient with fever to 101, leukocytosis to 12K, and mild tachycardia to 90s with patient c/o URI symptoms CT A/P showing R ML and R LL GGOs likely suggesting that she has sepsis 2/2 RML/LL PNA  - Will c/w zosyn, will add on azithromycin, will check urine legionella antigen, will ryan mcmullen on vancomycin for now

## 2019-04-30 NOTE — H&P ADULT - NSHPLABSRESULTS_GEN_ALL_CORE
LABS and ADDITIONAL STUDIES:                        11.9   12.13 )-----------( 155      ( 2019 18:56 )             36.3       122    |  126<L>  |  91<L>  |  8   ----------------------------<  213<H>  5.0   |  19<L>  |  0.70    Ca    8.6      2019 01:35    AG 13 -> 16    TPro  7.4  /  Alb  4.0  /  TBili  0.5  /  DBili  x   /  AST  21  /  ALT  15  /  AlkPhos  47      LIVER FUNCTIONS - ( 2019 18:57 )  Alb: 4.0 g/dL / Pro: 7.4 g/dL / ALK PHOS: 47 u/L / ALT: 15 u/L / AST: 21 u/L / GGT: x           Urinalysis Basic - ( 2019 18:56 )  Color: LIGHT YELLOW / Appearance: CLEAR / S.011 / pH: 7.5  Gluc: 70 / Ketone: NEGATIVE  / Bili: NEGATIVE / Urobili: NORMAL   Blood: NEGATIVE / Protein: NEGATIVE / Nitrite: NEGATIVE   Leuk Esterase: NEGATIVE / RBC: x / WBC x   Sq Epi: x / Non Sq Epi: x / Bacteria: x    Blood Gas Venous Comprehensive (19 @ 18:58)    Blood Gas Venous - Lactate: 3.5: Please note updated reference range. mmol/L  Blood Gas Venous Comprehensive (19 @ 22:31)    Blood Gas Venous - Lactate: 4.1: Please note updated reference range. mmol/L  Blood Gas Venous Comprehensive (19 @ 01:35)    Blood Gas Venous - Lactate: 4.3: Please note updated reference range. mmol/L    RVP - negative    < from: CT Brain Stroke Protocol (19 @ 18:10) >    IMPRESSION:     No acute intracranial hemorrhage, brain edema, midline shift, mass   effect, or hydrocephalus.     < end of copied text >    < from: CT Abdomen and Pelvis w/ IV Cont (19 @ 23:55) >    FINDINGS:    LOWER CHEST: Trace bilateral pleural effusions with adjacent subsegmental   atelectasis. Right middle lobe and lower lobe groundglass opacities.   Aortic calcifications.    LIVER: Hepatic steatosis.  BILE DUCTS: Normal caliber.  GALLBLADDER: Status post cholecystectomy.  SPLEEN: Within normal limits.  PANCREAS: Within normal limits.  ADRENALS: Within normal limits.  KIDNEYS/URETERS: Within normal limits.    BLADDER: Distended bladder.  REPRODUCTIVE ORGANS: Uterus and adnexa are within normal limits.    BOWEL: No bowel obstruction. Appendix is not definitely seen.  PERITONEUM: No ascites.  VESSELS:  Calcific atherosclerosis of the aorta.  RETROPERITONEUM: No lymphadenopathy.    ABDOMINAL WALL: Ventral hernia wall mesh.  BONES: Mild degenerative changes of the spine.    IMPRESSION: Etiology of the patient's abdominal pain is not elucidated in   this study.    < end of copied text >    EKG - sinus rhythm with 1st degree AV block and sinus arrhythmia, QTc 473, L axis deviation, No significant ST-T wave changes from prior

## 2019-04-30 NOTE — H&P ADULT - PROBLEM SELECTOR PLAN 1
- Likely metabolic 2/2 sepsis/hyponatremia, currently improving  - Initially there was a concern for stroke but patient without any focal findings and her CTH is without any acute pathology  - Would continue with plan as below for her sepsis, monitor her mental status/neurological exam, if her mental status fails to improve to baseline then would consider further neurological w/u with CTA H/N and MRI Head

## 2019-04-30 NOTE — ED ADULT NURSE REASSESSMENT NOTE - NS ED NURSE REASSESS COMMENT FT1
Pt resting comfortably in bed, denies any pain/ symptoms at this time, BP high, pt states she is having a headache denies blurry vision or any other symptoms- ADS provider Antoine made aware, awaiting orders, pt stable, in NAD, will continue to monitor.

## 2019-04-30 NOTE — H&P ADULT - PROBLEM SELECTOR PLAN 6
- Will place on HISS, FS qAC  - Patient and family unsure of her medications, will have to reconcile her meds in AM but for now as per OMR she seems to be on glipizide XL 2.5mg daily and metformin 500mg BID

## 2019-04-30 NOTE — CONSULT NOTE ADULT - SUBJECTIVE AND OBJECTIVE BOX
Kelly Mcknight M.D.   PGY-2 | Internal Medicine   [phone] | [id number]    CHIEF COMPLAINT: AMS     HPI: 88 year old Female with PMHx of HTN, HLD, DM2, and TIA brought in by family for AMS for 1 day duration. Family noticed left arm weakness as well as confusion and were concerned for a TIA or stroke. Patient has been having a productive cough for the past couple of days with rhinorrhea. Patient denies fever/chills, neck stiffness, photophobia, nausea/vomiting, abdominal pain or dysuria. In ED, CODE stroke was called and CT head was negative for hemorrhage or acute infarct. Patient found to be febrile to 101.2 with leukocytosis and elevated lactate. CT abdomen/pelvis negative, chest with right middle love and lower lobe ground glass opacities. Patient given IV antibiotics and IV fluids.     PAST MEDICAL & SURGICAL HISTORY:  HTN - Hypertension  Hypercholesterolemia  Diabetes mellitus  S/P umbilical hernia repair, follow-up exam  S/P cholecystectomy      FAMILY HISTORY:  No pertinent family history in first degree relatives    Allergies  naproxen (Nausea)    REVIEW OF SYSTEMS:  Constitutional: No fever/chills  Head: No headache   Eyes: No blurry vision, No diplopia  Neuro: No tremors, No muscle weakness   Cardiovascular: No chest pain, No palpitations  Respiratory: No SOB, + productive cough  GI: No nausea, No vomiting, No diarrhea  : No dysuria, No hematuria  Skin: No rash  MSK: No joint pain       OBJECTIVE:  ICU Vital Signs Last 24 Hrs  T(C): 36.8 (2019 00:36), Max: 38.4 (2019 18:50)  T(F): 98.2 (2019 00:36), Max: 101.2 (2019 18:50)  HR: 79 (2019 03:46) (79 - 95)  BP: 179/89 (2019 03:46) (168/83 - 184/78)  BP(mean): --  ABP: --  ABP(mean): --  RR: 18 (2019 03:46) (18 - 24)  SpO2: 96% (2019 03:46) (95% - 100%)      POCT Blood Glucose.: 191 mg/dL (2019 17:56)      PHYSICAL EXAM  GENERAL: NAD, lying comfortably in bed   HEAD:  Atraumatic, Normocephalic  EYES: EOMI b/l, PERRLA b/l, conjunctiva and sclera clear  NECK: Supple, No JVD, No LAD   CHEST/LUNG: clear to auscultation bilaterally   HEART: Regular rate and rhythm; S1 and S2 present, No murmurs, rubs, or gallops  ABDOMEN: Soft, Nontender, Nondistended; Bowel sounds present  EXTREMITIES:  2+ Peripheral Pulses, No clubbing, cyanosis, or edema  NEURO: AAOx3, non-focal   SKIN: No rashes or lesions      HOSPITAL MEDICATIONS:  dextrose 40% Gel 15 Gram(s) Oral once PRN  dextrose 50% Injectable 12.5 Gram(s) IV Push once  dextrose 50% Injectable 25 Gram(s) IV Push once  dextrose 50% Injectable 25 Gram(s) IV Push once  glucagon  Injectable 1 milliGRAM(s) IntraMuscular once PRN  insulin lispro (HumaLOG) corrective regimen sliding scale   SubCutaneous three times a day before meals  insulin lispro (HumaLOG) corrective regimen sliding scale   SubCutaneous at bedtime  dextrose 5%. 1000 milliLiter(s) IV Continuous <Continuous>    LABS:                        11.9   12.13 )-----------( 155      ( 2019 18:56 )             36.3     Hgb Trend: 11.9<--  30    126<L>  |  91<L>  |  8   ----------------------------<  213<H>  5.0   |  19<L>  |  0.70    Ca    8.6      2019 01:35    TPro  7.4  /  Alb  4.0  /  TBili  0.5  /  DBili  x   /  AST  21  /  ALT  15  /  AlkPhos  47      Creatinine Trend: 0.70<--, 0.71<--    Urinalysis Basic - ( 2019 18:56 )    Color: LIGHT YELLOW / Appearance: CLEAR / S.011 / pH: 7.5  Gluc: 70 / Ketone: NEGATIVE  / Bili: NEGATIVE / Urobili: NORMAL   Blood: NEGATIVE / Protein: NEGATIVE / Nitrite: NEGATIVE   Leuk Esterase: NEGATIVE / RBC: x / WBC x   Sq Epi: x / Non Sq Epi: x / Bacteria: x        Venous Blood Gas:   @ 01:35  7.34/44/33/22/55.8  VBG Lactate: 4.3  Venous Blood Gas:   @ 22:31  7.32/45/47/22/75.5  VBG Lactate: 4.1  Venous Blood Gas:   @ 18:58  7.41/39/27/24/51.4  VBG Lactate: 3.5      RADIOLOGY:  [x] Reviewed and interpreted by me    < from: CT Abdomen and Pelvis w/ IV Cont (19 @ 23:55) >  FINDINGS:    LOWER CHEST: Trace bilateral pleural effusions with adjacent subsegmental   atelectasis. Right middle lobe and lower lobe groundglass opacities.   Aortic calcifications.    LIVER: Hepatic steatosis.  BILE DUCTS: Normal caliber.  GALLBLADDER: Status post cholecystectomy.  SPLEEN: Within normal limits.  PANCREAS: Within normal limits.  ADRENALS: Within normal limits.  KIDNEYS/URETERS: Within normal limits.    BLADDER: Distended bladder.  REPRODUCTIVE ORGANS: Uterus and adnexa are within normal limits.    BOWEL: No bowel obstruction. Appendix is not definitely seen.  PERITONEUM: No ascites.  VESSELS:  Calcific atherosclerosis of the aorta.  RETROPERITONEUM: No lymphadenopathy.    ABDOMINAL WALL: Ventral hernia wall mesh.  BONES: Mild degenerative changes of the spine.    IMPRESSION: Etiology of the patient's abdominal pain is not elucidated in   this study.      < end of copied text >    < from: CT Brain Stroke Protocol (19 @ 18:10) >    IMPRESSION:     No acute intracranial hemorrhage, brain edema, midline shift, mass   effect, or hydrocephalus.     < end of copied text > Kelly Mcknight M.D.   PGY-2 | Internal Medicine   [phone] | [id number]    CHIEF COMPLAINT: AMS     HPI: 88 year old Female with PMHx of HTN, HLD, DM2, and TIA brought in by family for AMS for 1 day duration. Family noticed left arm weakness as well as confusion and were concerned for a TIA or stroke. Patient has been having a productive cough for the past couple of days with rhinorrhea. Patient denies fever/chills, neck stiffness, photophobia, nausea/vomiting, abdominal pain or dysuria. In ED, CODE stroke was called and CT head was negative for hemorrhage or acute infarct. Patient found to be febrile to 101.2 with leukocytosis and elevated lactate. CT abdomen/pelvis negative, chest with right middle love and lower lobe ground glass opacities. Patient given IV antibiotics and IV fluids.     PAST MEDICAL & SURGICAL HISTORY:  HTN - Hypertension  Hypercholesterolemia  Diabetes mellitus  S/P umbilical hernia repair, follow-up exam  S/P cholecystectomy      FAMILY HISTORY:  No pertinent family history in first degree relatives    Allergies  naproxen (Nausea)    REVIEW OF SYSTEMS:  Constitutional: No fever/chills  Head: No headache   Eyes: No blurry vision, No diplopia  Neuro: No tremors, No muscle weakness   Cardiovascular: No chest pain, No palpitations  Respiratory: No SOB, + productive cough  GI: No nausea, No vomiting, No diarrhea  : No dysuria, No hematuria  Skin: No rash  MSK: No joint pain       OBJECTIVE:  ICU Vital Signs Last 24 Hrs  T(C): 36.8 (2019 00:36), Max: 38.4 (2019 18:50)  T(F): 98.2 (2019 00:36), Max: 101.2 (2019 18:50)  HR: 79 (2019 03:46) (79 - 95)  BP: 179/89 (2019 03:46) (168/83 - 184/78)  BP(mean): --  ABP: --  ABP(mean): --  RR: 18 (2019 03:46) (18 - 24)  SpO2: 96% (2019 03:46) (95% - 100%)      POCT Blood Glucose.: 191 mg/dL (2019 17:56)      PHYSICAL EXAM  GENERAL: NAD, lying comfortably in bed   HEAD:  Atraumatic, Normocephalic  EYES: EOMI b/l, PERRLA b/l, conjunctiva and sclera clear  NECK: Supple, No JVD, No LAD   CHEST/LUNG: clear to auscultation bilaterally   HEART: Regular rate and rhythm; S1 and S2 present, No murmurs, rubs, or gallops  ABDOMEN: Soft, Nontender, Nondistended; Bowel sounds present  EXTREMITIES:  2+ Peripheral Pulses, No clubbing, cyanosis, or edema  NEURO: AAOx2, knew name, date, was unsure where she was, non-focal   SKIN: No rashes or lesions      HOSPITAL MEDICATIONS:  dextrose 40% Gel 15 Gram(s) Oral once PRN  dextrose 50% Injectable 12.5 Gram(s) IV Push once  dextrose 50% Injectable 25 Gram(s) IV Push once  dextrose 50% Injectable 25 Gram(s) IV Push once  glucagon  Injectable 1 milliGRAM(s) IntraMuscular once PRN  insulin lispro (HumaLOG) corrective regimen sliding scale   SubCutaneous three times a day before meals  insulin lispro (HumaLOG) corrective regimen sliding scale   SubCutaneous at bedtime  dextrose 5%. 1000 milliLiter(s) IV Continuous <Continuous>    LABS:                        11.9   12.13 )-----------( 155      ( 2019 18:56 )             36.3     Hgb Trend: 11.9<--      126<L>  |  91<L>  |  8   ----------------------------<  213<H>  5.0   |  19<L>  |  0.70    Ca    8.6      2019 01:35    TPro  7.4  /  Alb  4.0  /  TBili  0.5  /  DBili  x   /  AST  21  /  ALT  15  /  AlkPhos  47      Creatinine Trend: 0.70<--, 0.71<--    Urinalysis Basic - ( 2019 18:56 )    Color: LIGHT YELLOW / Appearance: CLEAR / S.011 / pH: 7.5  Gluc: 70 / Ketone: NEGATIVE  / Bili: NEGATIVE / Urobili: NORMAL   Blood: NEGATIVE / Protein: NEGATIVE / Nitrite: NEGATIVE   Leuk Esterase: NEGATIVE / RBC: x / WBC x   Sq Epi: x / Non Sq Epi: x / Bacteria: x        Venous Blood Gas:   @ 01:35  7.34/44/33/22/55.8  VBG Lactate: 4.3  Venous Blood Gas:   @ 22:31  7.32/45/47/22/75.5  VBG Lactate: 4.1  Venous Blood Gas:   @ 18:58  7.41/39/27/24/51.4  VBG Lactate: 3.5      RADIOLOGY:  [x] Reviewed and interpreted by me    < from: CT Abdomen and Pelvis w/ IV Cont (19 @ 23:55) >  FINDINGS:    LOWER CHEST: Trace bilateral pleural effusions with adjacent subsegmental   atelectasis. Right middle lobe and lower lobe groundglass opacities.   Aortic calcifications.    LIVER: Hepatic steatosis.  BILE DUCTS: Normal caliber.  GALLBLADDER: Status post cholecystectomy.  SPLEEN: Within normal limits.  PANCREAS: Within normal limits.  ADRENALS: Within normal limits.  KIDNEYS/URETERS: Within normal limits.    BLADDER: Distended bladder.  REPRODUCTIVE ORGANS: Uterus and adnexa are within normal limits.    BOWEL: No bowel obstruction. Appendix is not definitely seen.  PERITONEUM: No ascites.  VESSELS:  Calcific atherosclerosis of the aorta.  RETROPERITONEUM: No lymphadenopathy.    ABDOMINAL WALL: Ventral hernia wall mesh.  BONES: Mild degenerative changes of the spine.    IMPRESSION: Etiology of the patient's abdominal pain is not elucidated in   this study.      < end of copied text >    < from: CT Brain Stroke Protocol (19 @ 18:10) >    IMPRESSION:     No acute intracranial hemorrhage, brain edema, midline shift, mass   effect, or hydrocephalus.     < end of copied text >

## 2019-04-30 NOTE — ED ADULT NURSE REASSESSMENT NOTE - NS ED NURSE REASSESS COMMENT FT1
iv medications administered. pt placed back in bed in fowlers position and received comfort care.  insulin administered as per sliding scale, pt eating her lunch with pca savanna. pt

## 2019-05-01 LAB
ANION GAP SERPL CALC-SCNC: 16 MMO/L — HIGH (ref 7–14)
BACTERIA UR CULT: SIGNIFICANT CHANGE UP
BUN SERPL-MCNC: 9 MG/DL — SIGNIFICANT CHANGE UP (ref 7–23)
CALCIUM SERPL-MCNC: 9.2 MG/DL — SIGNIFICANT CHANGE UP (ref 8.4–10.5)
CHLORIDE SERPL-SCNC: 94 MMOL/L — LOW (ref 98–107)
CO2 SERPL-SCNC: 22 MMOL/L — SIGNIFICANT CHANGE UP (ref 22–31)
CREAT SERPL-MCNC: 0.76 MG/DL — SIGNIFICANT CHANGE UP (ref 0.5–1.3)
GLUCOSE SERPL-MCNC: 299 MG/DL — HIGH (ref 70–99)
HBA1C BLD-MCNC: 8.4 % — HIGH (ref 4–5.6)
HCT VFR BLD CALC: 39.9 % — SIGNIFICANT CHANGE UP (ref 34.5–45)
HGB BLD-MCNC: 13.5 G/DL — SIGNIFICANT CHANGE UP (ref 11.5–15.5)
MCHC RBC-ENTMCNC: 29.8 PG — SIGNIFICANT CHANGE UP (ref 27–34)
MCHC RBC-ENTMCNC: 33.8 % — SIGNIFICANT CHANGE UP (ref 32–36)
MCV RBC AUTO: 88.1 FL — SIGNIFICANT CHANGE UP (ref 80–100)
NRBC # FLD: 0.02 K/UL — SIGNIFICANT CHANGE UP (ref 0–0)
PLATELET # BLD AUTO: 209 K/UL — SIGNIFICANT CHANGE UP (ref 150–400)
PMV BLD: 9.2 FL — SIGNIFICANT CHANGE UP (ref 7–13)
POTASSIUM SERPL-MCNC: 4.1 MMOL/L — SIGNIFICANT CHANGE UP (ref 3.5–5.3)
POTASSIUM SERPL-SCNC: 4.1 MMOL/L — SIGNIFICANT CHANGE UP (ref 3.5–5.3)
PROCALCITONIN SERPL-MCNC: 0.05 NG/ML — SIGNIFICANT CHANGE UP (ref 0.02–0.1)
RBC # BLD: 4.53 M/UL — SIGNIFICANT CHANGE UP (ref 3.8–5.2)
RBC # FLD: 12.1 % — SIGNIFICANT CHANGE UP (ref 10.3–14.5)
SODIUM SERPL-SCNC: 132 MMOL/L — LOW (ref 135–145)
SPECIMEN SOURCE: SIGNIFICANT CHANGE UP
WBC # BLD: 7.87 K/UL — SIGNIFICANT CHANGE UP (ref 3.8–10.5)
WBC # FLD AUTO: 7.87 K/UL — SIGNIFICANT CHANGE UP (ref 3.8–10.5)

## 2019-05-01 RX ORDER — INSULIN GLARGINE 100 [IU]/ML
5 INJECTION, SOLUTION SUBCUTANEOUS AT BEDTIME
Qty: 0 | Refills: 0 | Status: DISCONTINUED | OUTPATIENT
Start: 2019-05-01 | End: 2019-05-02

## 2019-05-01 RX ADMIN — Medication 3: at 12:01

## 2019-05-01 RX ADMIN — AZITHROMYCIN 250 MILLIGRAM(S): 500 TABLET, FILM COATED ORAL at 04:09

## 2019-05-01 RX ADMIN — PIPERACILLIN AND TAZOBACTAM 25 GRAM(S): 4; .5 INJECTION, POWDER, LYOPHILIZED, FOR SOLUTION INTRAVENOUS at 05:23

## 2019-05-01 RX ADMIN — ATORVASTATIN CALCIUM 10 MILLIGRAM(S): 80 TABLET, FILM COATED ORAL at 21:25

## 2019-05-01 RX ADMIN — INSULIN GLARGINE 5 UNIT(S): 100 INJECTION, SOLUTION SUBCUTANEOUS at 21:41

## 2019-05-01 RX ADMIN — Medication 3: at 08:04

## 2019-05-01 RX ADMIN — LOSARTAN POTASSIUM 100 MILLIGRAM(S): 100 TABLET, FILM COATED ORAL at 05:23

## 2019-05-01 RX ADMIN — PIPERACILLIN AND TAZOBACTAM 25 GRAM(S): 4; .5 INJECTION, POWDER, LYOPHILIZED, FOR SOLUTION INTRAVENOUS at 21:25

## 2019-05-01 RX ADMIN — Medication 2: at 16:57

## 2019-05-01 RX ADMIN — PIPERACILLIN AND TAZOBACTAM 25 GRAM(S): 4; .5 INJECTION, POWDER, LYOPHILIZED, FOR SOLUTION INTRAVENOUS at 15:09

## 2019-05-01 NOTE — CONSULT NOTE ADULT - SUBJECTIVE AND OBJECTIVE BOX
Patient is a 88y old  Female who presents with a chief complaint of Altered mental status (2019 04:05)      HPI:    This is an 88F with history of HTN, HLD, DM2, and TIA who presents to the hospital with altered mental status x1 day. Patient currently is AAO x1, unable to fully give history, mostly stating her preference to go home and that she is 88 years old (though she does answer pointed questions appropriately). As per family (spoke with son Mikie [phone]) patient was in her usual state of health this AM but around 1030AM they had noticed that she was having L arm weakness. A little while later, they had noted that the patient became confused and was not recognizing any of the family members and that she was speaking nonsense. As per the son, the patient had a similar presentation when she had her TIA and the family was worried that she was having another TIA or stroke and therefore brought her to the hospital for eval. The family denies noticing any infectious complaints on the patient or any fevers/chills but the patient states that she has been having a mildly productive cough with nasal congestion and R temporal [place]. She denies any neck stiffness, light/sound sensitivity, nausea/vomiting, abd pain, back pain, dysuria, or extremity/joint pain. No other complaints. As per the son, the patient at baseline is very functional, is able to take care of her bills and living expenditure by herself without any need for help from others.    On arrival to the ED, her vitals were T 100.2 -> 101.2 (rectal), P 80, /92, RR 24, O2 sat 99% RA. Her lab work showed mild leukocytosis, hyponatremia, and an elevated blood lactate that failed to resolve despite significant IVF. She was given vancomycin 1Gg IVPB x1, Zosyn 3.375G IVPB x1, Tylenol 1G IVPB x1, and NS 2.5L. Her mental status initially on arrival to the ED was very poor but it improved during her ED stay though it was still not back to her usual baseline. (2019 03:46)      REVIEW OF SYSTEMS:    CONSTITUTIONAL: No fever, weight loss, or fatigue  EYES: No eye pain, visual disturbances, or discharge  ENMT:  No sore throat  NECK: No pain or stiffness  RESPIRATORY: No cough, wheezing, chills or hemoptysis; No shortness of breath  CARDIOVASCULAR: No chest pain, palpitations, dizziness, or leg swelling  GASTROINTESTINAL: No abdominal or epigastric pain. No nausea, vomiting, or hematemesis; No diarrhea or constipation. No melena or hematochezia.  GENITOURINARY: No dysuria, frequency, hematuria, or incontinence  NEUROLOGICAL: No headaches, memory loss, loss of strength, numbness, or tremors  SKIN: No itching, burning, rashes, or lesions   LYMPH NODES: No enlarged glands  MUSCULOSKELETAL: No joint pain or swelling; No muscle, back, or extremity pain      PAST MEDICAL & SURGICAL HISTORY:  HTN - Hypertension  Hypercholesterolemia  Diabetes mellitus  S/P umbilical hernia repair, follow-up exam  S/P cholecystectomy      Allergies    naproxen (Nausea)    Intolerances        FAMILY HISTORY:  No pertinent family history in first degree relatives      SOCIAL HISTORY:        MEDICATIONS  (STANDING):  atorvastatin 10 milliGRAM(s) Oral at bedtime  azithromycin  IVPB 500 milliGRAM(s) IV Intermittent every 24 hours  azithromycin  IVPB      dextrose 5%. 1000 milliLiter(s) (50 mL/Hr) IV Continuous <Continuous>  dextrose 50% Injectable 12.5 Gram(s) IV Push once  dextrose 50% Injectable 25 Gram(s) IV Push once  dextrose 50% Injectable 25 Gram(s) IV Push once  insulin lispro (HumaLOG) corrective regimen sliding scale   SubCutaneous three times a day before meals  insulin lispro (HumaLOG) corrective regimen sliding scale   SubCutaneous at bedtime  losartan 100 milliGRAM(s) Oral daily  piperacillin/tazobactam IVPB. 3.375 Gram(s) IV Intermittent every 8 hours    MEDICATIONS  (PRN):  dextrose 40% Gel 15 Gram(s) Oral once PRN Blood Glucose LESS THAN 70 milliGRAM(s)/deciliter  glucagon  Injectable 1 milliGRAM(s) IntraMuscular once PRN Glucose LESS THAN 70 milligrams/deciliter      Vital Signs Last 24 Hrs  T(C): 36.4 (01 May 2019 05:11), Max: 36.4 (2019 21:11)  T(F): 97.5 (01 May 2019 05:11), Max: 97.6 (2019 21:11)  HR: 71 (01 May 2019 05:11) (71 - 91)  BP: 159/78 (01 May 2019 05:11) (159/78 - 189/76)  BP(mean): --  RR: 18 (01 May 2019 05:11) (18 - 18)  SpO2: 99% (01 May 2019 05:11) (98% - 99%)    PHYSICAL EXAM:    GENERAL: NAD, well-groomed  HEAD:  Atraumatic, Normocephalic  EYES: EOMI, PERRLA, conjunctiva and sclera clear  ENMT: No tonsillar erythema, exudates, or enlargement; Moist mucous membranes  NECK: Supple, No JVD  CHEST/LUNG: Clear to percussion bilaterally; No rales, rhonchi, wheezing, or rubs  HEART: Regular rate and rhythm; No murmurs, rubs, or gallops  ABDOMEN: Soft, Nontender, Nondistended; Bowel sounds present  EXTREMITIES:  2+ Peripheral Pulses, No clubbing, cyanosis, or edema  LYMPH: No lymphadenopathy noted  SKIN: No rashes or lesions    LABS:  CBC Full  -  ( 01 May 2019 05:30 )  WBC Count : 7.87 K/uL  RBC Count : 4.53 M/uL  Hemoglobin : 13.5 g/dL  Hematocrit : 39.9 %  Platelet Count - Automated : 209 K/uL  Mean Cell Volume : 88.1 fL  Mean Cell Hemoglobin : 29.8 pg  Mean Cell Hemoglobin Concentration : 33.8 %  Auto Neutrophil # : x  Auto Lymphocyte # : x  Auto Monocyte # : x  Auto Eosinophil # : x  Auto Basophil # : x  Auto Neutrophil % : x  Auto Lymphocyte % : x  Auto Monocyte % : x  Auto Eosinophil % : x  Auto Basophil % : x      05-01    132<L>  |  94<L>  |  9   ----------------------------<  299<H>  4.1   |  22  |  0.76    Ca    9.2      01 May 2019 05:30  Phos  3.4     04-30  Mg     1.5     04-30    TPro  7.4  /  Alb  4.0  /  TBili  0.5  /  DBili  x   /  AST  21  /  ALT  15  /  AlkPhos  47  -29      LIVER FUNCTIONS - ( 2019 18:57 )  Alb: 4.0 g/dL / Pro: 7.4 g/dL / ALK PHOS: 47 u/L / ALT: 15 u/L / AST: 21 u/L / GGT: x                               MICROBIOLOGY:        Urinalysis Basic - ( 2019 18:56 )    Color: LIGHT YELLOW / Appearance: CLEAR / S.011 / pH: 7.5  Gluc: 70 / Ketone: NEGATIVE  / Bili: NEGATIVE / Urobili: NORMAL   Blood: NEGATIVE / Protein: NEGATIVE / Nitrite: NEGATIVE   Leuk Esterase: NEGATIVE / RBC: x / WBC x   Sq Epi: x / Non Sq Epi: x / Bacteria: x        Culture - Urine (19 @ 19:32)    Culture - Urine:   NO GROWTH AT 24 HOURS    Specimen Source: URINE MIDSTREAM    Culture - Blood (19 @ 19:28)    Culture - Blood:   NO ORGANISMS ISOLATED  NO ORGANISMS ISOLATED AT 24 HOURS    Specimen Source: BLOOD VENOUS    Culture - Blood (19 @ 19:28)    Culture - Blood:   NO ORGANISMS ISOLATED  NO ORGANISMS ISOLATED AT 24 HOURS    Specimen Source: BLOOD PERIPHERAL    Rapid Respiratory Viral Panel (19 @ 23:40)    Adenovirus (RapRVP): Not Detected    Influenza A (RapRVP): Not Detected    Influenza AH1 2009 (RapRVP): Not Detected    Influenza AH1 (RapRVP): Not Detected    Influenza AH3 (RapRVP): Not Detected    Influenza B (RapRVP): Not Detected    Parainfluenza 1 (RapRVP): Not Detected    Parainfluenza 2 (RapRVP): Not Detected    Parainfluenza 3 (RapRVP): Not Detected    Parainfluenza 4 (RapRVP): Not Detected    Resp Syncytial Virus (RapRVP): Not Detected    Chlamydia pneumoniae (RapRVP): Not Detected    Mycoplasma pneumoniae (RapRVP): Not Detected    Entero/Rhinovirus (RapRVP): Not Detected    hMPV (RapRVP): Not Detected    Coronavirus (229E,HKU1,NL63,OC43): Not Detected This Respiratory Panel uses polymerase chain reaction (PCR)  to detect for adenovirus; coronavirus (HKU1, NL63, 229E,  OC43); human metapneumovirus (hMPV); human  enterovirus/rhinovirus (Entero/RV); influenza A; influenza  A/H1: influenza A/H3; influenza A/H1-2009; influenza B;  parainfluenza viruses 1,2,3,4; respiratory syncytial virus;  Mycoplasma pneumoniae; and Chlamydophila pneumoniae.            RADIOLOGY:      < from: CT Abdomen and Pelvis w/ IV Cont (19 @ 23:55) >    FINDINGS:    LOWER CHEST: Trace bilateral pleural effusions with adjacent subsegmental   atelectasis. Right middle lobe and lower lobe groundglass opacities.   Aortic calcifications.    LIVER: Hepatic steatosis.  BILE DUCTS: Normal caliber.  GALLBLADDER: Status post cholecystectomy.  SPLEEN: Within normal limits.  PANCREAS: Within normal limits.  ADRENALS: Within normal limits.  KIDNEYS/URETERS: Within normal limits.    BLADDER: Distended bladder.  REPRODUCTIVE ORGANS: Uterus and adnexa are within normal limits.    BOWEL: No bowel obstruction. Appendix is not definitely seen.  PERITONEUM: No ascites.  VESSELS:  Calcific atherosclerosis of the aorta.  RETROPERITONEUM: No lymphadenopathy.    ABDOMINAL WALL: Ventral hernia wall mesh.  BONES: Mild degenerative changes of the spine.    IMPRESSION: Etiology of the patient's abdominal pain is not elucidated in   this study.    < end of copied text >        < from: Xray Chest 1 View- PORTABLE-Urgent (19 @ 20:09) >    FINDINGS:   The heart size is normal.   The lungs are clear. No pleural effusion or pneumothorax.  No acute osseous findings.    IMPRESSION:   Clear lungs.    < end of copied text >            < from: CT Brain Stroke Protocol (19 @ 18:10) >  IMPRESSION:     No acute intracranial hemorrhage, brain edema, midline shift, mass   effect, or hydrocephalus.     < end of copied text > Patient is a 88y old  Female who presents with a chief complaint of Altered mental status (2019 04:05)      HPI:    This is an 88F with history of HTN, HLD, DM2, and TIA who presents to the hospital with altered mental status x1 day. Patient currently is AAO x1, unable to fully give history, mostly stating her preference to go home and that she is 88 years old (though she does answer pointed questions appropriately).    As per family (spoke with son Mikie [phone]) patient was in her usual state of health this AM but around 1030AM they had noticed that she was having L arm weakness. A little while later, they had noted that the patient became confused and was not recognizing any of the family members and that she was speaking nonsense. As per the son, the patient had a similar presentation when she had her TIA and the family was worried that she was having another TIA or stroke and therefore brought her to the hospital for eval. The family denies noticing any infectious complaints on the patient or any fevers/chills but the patient states that she has been having a mildly productive cough with nasal congestion and R temporal [place]. She denies any neck stiffness, light/sound sensitivity, nausea/vomiting, abd pain, back pain, dysuria, or extremity/joint pain. No other complaints. As per the son, the patient at baseline is very functional, is able to take care of her bills and living expenditure by herself without any need for help from others.    On arrival to the ED, her vitals were T 100.2 -> 101.2 (rectal), P 80, /92, RR 24, O2 sat 99% RA. Her lab work showed mild leukocytosis, hyponatremia, and an elevated blood lactate that failed to resolve despite significant IVF. She was given vancomycin 1Gg IVPB x1, Zosyn 3.375G IVPB x1, Tylenol 1G IVPB x1, and NS 2.5L. Her mental status initially on arrival to the ED was very poor but it improved during her ED stay though it was still not back to her usual baseline. (2019 03:46)  Lact 4.3.      REVIEW OF SYSTEMS:    CONSTITUTIONAL: No fever, weight loss, or fatigue  EYES: No eye pain, visual disturbances, or discharge  ENMT:  No sore throat  NECK: No pain or stiffness  RESPIRATORY: No cough, wheezing, chills or hemoptysis; No shortness of breath  CARDIOVASCULAR: No chest pain, palpitations, dizziness, or leg swelling  GASTROINTESTINAL: No abdominal or epigastric pain. No nausea, vomiting, or hematemesis; No diarrhea or constipation. No melena or hematochezia.  GENITOURINARY: No dysuria, frequency, hematuria, or incontinence  NEUROLOGICAL: No headaches, memory loss, loss of strength, numbness, or tremors  SKIN: No itching, burning, rashes, or lesions   LYMPH NODES: No enlarged glands  MUSCULOSKELETAL: No joint pain or swelling; No muscle, back, or extremity pain      PAST MEDICAL & SURGICAL HISTORY:  HTN - Hypertension  Hypercholesterolemia  Diabetes mellitus  S/P umbilical hernia repair, follow-up exam  S/P cholecystectomy      Allergies    naproxen (Nausea)    Intolerances        FAMILY HISTORY:  No pertinent family history in first degree relatives      SOCIAL HISTORY:  Lives alone  	Ambulates with walker  Denies any tobacco, etoh, or illicit substance use      MEDICATIONS  (STANDING):  atorvastatin 10 milliGRAM(s) Oral at bedtime  azithromycin  IVPB 500 milliGRAM(s) IV Intermittent every 24 hours  azithromycin  IVPB      dextrose 5%. 1000 milliLiter(s) (50 mL/Hr) IV Continuous <Continuous>  dextrose 50% Injectable 12.5 Gram(s) IV Push once  dextrose 50% Injectable 25 Gram(s) IV Push once  dextrose 50% Injectable 25 Gram(s) IV Push once  insulin lispro (HumaLOG) corrective regimen sliding scale   SubCutaneous three times a day before meals  insulin lispro (HumaLOG) corrective regimen sliding scale   SubCutaneous at bedtime  losartan 100 milliGRAM(s) Oral daily  piperacillin/tazobactam IVPB. 3.375 Gram(s) IV Intermittent every 8 hours    MEDICATIONS  (PRN):  dextrose 40% Gel 15 Gram(s) Oral once PRN Blood Glucose LESS THAN 70 milliGRAM(s)/deciliter  glucagon  Injectable 1 milliGRAM(s) IntraMuscular once PRN Glucose LESS THAN 70 milligrams/deciliter      Vital Signs Last 24 Hrs  T(C): 36.4 (01 May 2019 05:11), Max: 36.4 (2019 21:11)  T(F): 97.5 (01 May 2019 05:11), Max: 97.6 (2019 21:11)  HR: 71 (01 May 2019 05:11) (71 - 91)  BP: 159/78 (01 May 2019 05:11) (159/78 - 189/76)  BP(mean): --  RR: 18 (01 May 2019 05:11) (18 - 18)  SpO2: 99% (01 May 2019 05:11) (98% - 99%)    PHYSICAL EXAM:    GENERAL: NAD, well-groomed  HEAD:  Atraumatic, Normocephalic  EYES: EOMI, PERRLA, conjunctiva and sclera clear  ENMT: No tonsillar erythema, exudates, or enlargement; Moist mucous membranes  NECK: Supple, No JVD  CHEST/LUNG: Clear to percussion bilaterally; No rales, rhonchi, wheezing, or rubs  HEART: Regular rate and rhythm; No murmurs, rubs, or gallops  ABDOMEN: Soft, Nontender, Nondistended; Bowel sounds present  EXTREMITIES:  2+ Peripheral Pulses, No clubbing, cyanosis, or edema  LYMPH: No lymphadenopathy noted  SKIN: No rashes or lesions    LABS:  CBC Full  -  ( 01 May 2019 05:30 )  WBC Count : 7.87 K/uL  RBC Count : 4.53 M/uL  Hemoglobin : 13.5 g/dL  Hematocrit : 39.9 %  Platelet Count - Automated : 209 K/uL  Mean Cell Volume : 88.1 fL  Mean Cell Hemoglobin : 29.8 pg  Mean Cell Hemoglobin Concentration : 33.8 %  Auto Neutrophil # : x  Auto Lymphocyte # : x  Auto Monocyte # : x  Auto Eosinophil # : x  Auto Basophil # : x  Auto Neutrophil % : x  Auto Lymphocyte % : x  Auto Monocyte % : x  Auto Eosinophil % : x  Auto Basophil % : x      05    132<L>  |  94<L>  |  9   ----------------------------<  299<H>  4.1   |  22  |  0.76    Ca    9.2      01 May 2019 05:30  Phos  3.4     04-30  Mg     1.5     04-30    TPro  7.4  /  Alb  4.0  /  TBili  0.5  /  DBili  x   /  AST  21  /  ALT  15  /  AlkPhos  47  04-29      LIVER FUNCTIONS - ( 2019 18:57 )  Alb: 4.0 g/dL / Pro: 7.4 g/dL / ALK PHOS: 47 u/L / ALT: 15 u/L / AST: 21 u/L / GGT: x                               MICROBIOLOGY:        Urinalysis Basic - ( 2019 18:56 )    Color: LIGHT YELLOW / Appearance: CLEAR / S.011 / pH: 7.5  Gluc: 70 / Ketone: NEGATIVE  / Bili: NEGATIVE / Urobili: NORMAL   Blood: NEGATIVE / Protein: NEGATIVE / Nitrite: NEGATIVE   Leuk Esterase: NEGATIVE / RBC: x / WBC x   Sq Epi: x / Non Sq Epi: x / Bacteria: x        Culture - Urine (19 @ 19:32)    Culture - Urine:   NO GROWTH AT 24 HOURS    Specimen Source: URINE MIDSTREAM    Culture - Blood (19 @ 19:28)    Culture - Blood:   NO ORGANISMS ISOLATED  NO ORGANISMS ISOLATED AT 24 HOURS    Specimen Source: BLOOD VENOUS    Culture - Blood (19 @ 19:28)    Culture - Blood:   NO ORGANISMS ISOLATED  NO ORGANISMS ISOLATED AT 24 HOURS    Specimen Source: BLOOD PERIPHERAL    Rapid Respiratory Viral Panel (19 @ 23:40)    Adenovirus (RapRVP): Not Detected    Influenza A (RapRVP): Not Detected    Influenza AH1 2009 (RapRVP): Not Detected    Influenza AH1 (RapRVP): Not Detected    Influenza AH3 (RapRVP): Not Detected    Influenza B (RapRVP): Not Detected    Parainfluenza 1 (RapRVP): Not Detected    Parainfluenza 2 (RapRVP): Not Detected    Parainfluenza 3 (RapRVP): Not Detected    Parainfluenza 4 (RapRVP): Not Detected    Resp Syncytial Virus (RapRVP): Not Detected    Chlamydia pneumoniae (RapRVP): Not Detected    Mycoplasma pneumoniae (RapRVP): Not Detected    Entero/Rhinovirus (RapRVP): Not Detected    hMPV (RapRVP): Not Detected    Coronavirus (229E,HKU1,NL63,OC43): Not Detected This Respiratory Panel uses polymerase chain reaction (PCR)  to detect for adenovirus; coronavirus (HKU1, NL63, 229E,  OC43); human metapneumovirus (hMPV); human  enterovirus/rhinovirus (Entero/RV); influenza A; influenza  A/H1: influenza A/H3; influenza A/H1-2009; influenza B;  parainfluenza viruses 1,2,3,4; respiratory syncytial virus;  Mycoplasma pneumoniae; and Chlamydophila pneumoniae.            RADIOLOGY:      < from: CT Abdomen and Pelvis w/ IV Cont (19 @ 23:55) >    FINDINGS:    LOWER CHEST: Trace bilateral pleural effusions with adjacent subsegmental   atelectasis. Right middle lobe and lower lobe groundglass opacities.   Aortic calcifications.    LIVER: Hepatic steatosis.  BILE DUCTS: Normal caliber.  GALLBLADDER: Status post cholecystectomy.  SPLEEN: Within normal limits.  PANCREAS: Within normal limits.  ADRENALS: Within normal limits.  KIDNEYS/URETERS: Within normal limits.    BLADDER: Distended bladder.  REPRODUCTIVE ORGANS: Uterus and adnexa are within normal limits.    BOWEL: No bowel obstruction. Appendix is not definitely seen.  PERITONEUM: No ascites.  VESSELS:  Calcific atherosclerosis of the aorta.  RETROPERITONEUM: No lymphadenopathy.    ABDOMINAL WALL: Ventral hernia wall mesh.  BONES: Mild degenerative changes of the spine.    IMPRESSION: Etiology of the patient's abdominal pain is not elucidated in   this study.    < end of copied text >        < from: Xray Chest 1 View- PORTABLE-Urgent (19 @ 20:09) >    FINDINGS:   The heart size is normal.   The lungs are clear. No pleural effusion or pneumothorax.  No acute osseous findings.    IMPRESSION:   Clear lungs.    < end of copied text >            < from: CT Brain Stroke Protocol (19 @ 18:10) >  IMPRESSION:     No acute intracranial hemorrhage, brain edema, midline shift, mass   effect, or hydrocephalus.     < end of copied text > Patient is a 88y old  Female who presents with a chief complaint of Altered mental status (2019 04:05)      HPI:    This is an 88F with history of HTN, HLD, DM2, and TIA who presents to the hospital with altered mental status x1 day. Patient currently is AAO x1, unable to fully give history, mostly stating her preference to go home and that she is 88 years old (though she does answer pointed questions appropriately).    As per family (spoke with son Mikie [phone]) patient was in her usual state of health this AM but around 1030AM they had noticed that she was having L arm weakness. A little while later, they had noted that the patient became confused and was not recognizing any of the family members and that she was speaking nonsense. As per the son, the patient had a similar presentation when she had her TIA and the family was worried that she was having another TIA or stroke and therefore brought her to the hospital for eval. The family denies noticing any infectious complaints on the patient or any fevers/chills but the patient states that she has been having a mildly productive cough with nasal congestion and R temporal [place]. She denies any neck stiffness, light/sound sensitivity, nausea/vomiting, abd pain, back pain, dysuria, or extremity/joint pain. No other complaints. As per the son, the patient at baseline is very functional, is able to take care of her bills and living expenditure by herself without any need for help from others.    On arrival to the ED, her vitals were T 100.2 -> 101.2 (rectal), P 80, /92, RR 24, O2 sat 99% RA. Her lab work showed mild leukocytosis, hyponatremia, and an elevated blood lactate that failed to resolve despite significant IVF. She was given vancomycin 1Gg IVPB x1, Zosyn 3.375G IVPB x1, Tylenol 1G IVPB x1, and NS 2.5L. Her mental status initially on arrival to the ED was very poor but it improved during her ED stay though it was still not back to her usual baseline. (2019 03:46)  Lact 4.3.        REVIEW OF SYSTEMS:    CONSTITUTIONAL: No fever, weight loss, or fatigue  EYES: No eye pain, visual disturbances, or discharge  ENMT:  No sore throat  NECK: No pain or stiffness  RESPIRATORY: (+) dry cough  CARDIOVASCULAR: No chest pain, palpitations, dizziness, or leg swelling  GASTROINTESTINAL: No abdominal or epigastric pain. No nausea, vomiting, or hematemesis; No diarrhea or constipation. No melena or hematochezia.  GENITOURINARY: No dysuria, frequency, hematuria, or incontinence  NEUROLOGICAL: No headaches, memory loss, loss of strength, numbness, or tremors  SKIN: No itching, burning, rashes, or lesions   LYMPH NODES: No enlarged glands  MUSCULOSKELETAL: No joint pain or swelling; No muscle, back, or extremity pain      PAST MEDICAL & SURGICAL HISTORY:  HTN - Hypertension  Hypercholesterolemia  Diabetes mellitus  S/P umbilical hernia repair, follow-up exam  S/P cholecystectomy      Allergies    naproxen (Nausea)    Intolerances        FAMILY HISTORY:  No pertinent family history in first degree relatives      SOCIAL HISTORY:  Lives alone  	Ambulates with walker  Denies any tobacco, etoh, or illicit substance use      MEDICATIONS  (STANDING):  atorvastatin 10 milliGRAM(s) Oral at bedtime  azithromycin  IVPB 500 milliGRAM(s) IV Intermittent every 24 hours  azithromycin  IVPB      dextrose 5%. 1000 milliLiter(s) (50 mL/Hr) IV Continuous <Continuous>  dextrose 50% Injectable 12.5 Gram(s) IV Push once  dextrose 50% Injectable 25 Gram(s) IV Push once  dextrose 50% Injectable 25 Gram(s) IV Push once  insulin lispro (HumaLOG) corrective regimen sliding scale   SubCutaneous three times a day before meals  insulin lispro (HumaLOG) corrective regimen sliding scale   SubCutaneous at bedtime  losartan 100 milliGRAM(s) Oral daily  piperacillin/tazobactam IVPB. 3.375 Gram(s) IV Intermittent every 8 hours    MEDICATIONS  (PRN):  dextrose 40% Gel 15 Gram(s) Oral once PRN Blood Glucose LESS THAN 70 milliGRAM(s)/deciliter  glucagon  Injectable 1 milliGRAM(s) IntraMuscular once PRN Glucose LESS THAN 70 milligrams/deciliter      Vital Signs Last 24 Hrs  T(C): 36.4 (01 May 2019 05:11), Max: 36.4 (2019 21:11)  T(F): 97.5 (01 May 2019 05:11), Max: 97.6 (2019 21:11)  HR: 71 (01 May 2019 05:11) (71 - 91)  BP: 159/78 (01 May 2019 05:11) (159/78 - 189/76)  BP(mean): --  RR: 18 (01 May 2019 05:11) (18 - 18)  SpO2: 99% (01 May 2019 05:11) (98% - 99%)    PHYSICAL EXAM:    GENERAL: NAD, well-groomed  HEAD:  Atraumatic, Normocephalic  EYES: EOMI, PERRLA, conjunctiva and sclera clear  ENMT: No tonsillar erythema, exudates, or enlargement; Moist mucous membranes  NECK: Supple, No JVD  CHEST/LUNG: Clear to percussion bilaterally; No rales, rhonchi, wheezing, or rubs  HEART: Regular rate and rhythm; No murmurs, rubs, or gallops  ABDOMEN: Soft, Nontender, Nondistended; Bowel sounds present  EXTREMITIES:  2+ Peripheral Pulses, No clubbing, cyanosis, or edema  LYMPH: No lymphadenopathy noted  SKIN: No rashes or lesions    LABS:  CBC Full  -  ( 01 May 2019 05:30 )  WBC Count : 7.87 K/uL  RBC Count : 4.53 M/uL  Hemoglobin : 13.5 g/dL  Hematocrit : 39.9 %  Platelet Count - Automated : 209 K/uL  Mean Cell Volume : 88.1 fL  Mean Cell Hemoglobin : 29.8 pg  Mean Cell Hemoglobin Concentration : 33.8 %  Auto Neutrophil # : x  Auto Lymphocyte # : x  Auto Monocyte # : x  Auto Eosinophil # : x  Auto Basophil # : x  Auto Neutrophil % : x  Auto Lymphocyte % : x  Auto Monocyte % : x  Auto Eosinophil % : x  Auto Basophil % : x      05-01    132<L>  |  94<L>  |  9   ----------------------------<  299<H>  4.1   |  22  |  0.76    Ca    9.2      01 May 2019 05:30  Phos  3.4     04-30  Mg     1.5     04-30    TPro  7.4  /  Alb  4.0  /  TBili  0.5  /  DBili  x   /  AST  21  /  ALT  15  /  AlkPhos  47  04-29      LIVER FUNCTIONS - ( 2019 18:57 )  Alb: 4.0 g/dL / Pro: 7.4 g/dL / ALK PHOS: 47 u/L / ALT: 15 u/L / AST: 21 u/L / GGT: x                               MICROBIOLOGY:        Urinalysis Basic - ( 2019 18:56 )    Color: LIGHT YELLOW / Appearance: CLEAR / S.011 / pH: 7.5  Gluc: 70 / Ketone: NEGATIVE  / Bili: NEGATIVE / Urobili: NORMAL   Blood: NEGATIVE / Protein: NEGATIVE / Nitrite: NEGATIVE   Leuk Esterase: NEGATIVE / RBC: x / WBC x   Sq Epi: x / Non Sq Epi: x / Bacteria: x        Culture - Urine (19 @ 19:32)    Culture - Urine:   NO GROWTH AT 24 HOURS    Specimen Source: URINE MIDSTREAM    Culture - Blood (19 @ 19:28)    Culture - Blood:   NO ORGANISMS ISOLATED  NO ORGANISMS ISOLATED AT 24 HOURS    Specimen Source: BLOOD VENOUS    Culture - Blood (19 @ 19:28)    Culture - Blood:   NO ORGANISMS ISOLATED  NO ORGANISMS ISOLATED AT 24 HOURS    Specimen Source: BLOOD PERIPHERAL    Rapid Respiratory Viral Panel (19 @ 23:40)    Adenovirus (RapRVP): Not Detected    Influenza A (RapRVP): Not Detected    Influenza AH1 2009 (RapRVP): Not Detected    Influenza AH1 (RapRVP): Not Detected    Influenza AH3 (RapRVP): Not Detected    Influenza B (RapRVP): Not Detected    Parainfluenza 1 (RapRVP): Not Detected    Parainfluenza 2 (RapRVP): Not Detected    Parainfluenza 3 (RapRVP): Not Detected    Parainfluenza 4 (RapRVP): Not Detected    Resp Syncytial Virus (RapRVP): Not Detected    Chlamydia pneumoniae (RapRVP): Not Detected    Mycoplasma pneumoniae (RapRVP): Not Detected    Entero/Rhinovirus (RapRVP): Not Detected    hMPV (RapRVP): Not Detected    Coronavirus (229E,HKU1,NL63,OC43): Not Detected This Respiratory Panel uses polymerase chain reaction (PCR)  to detect for adenovirus; coronavirus (HKU1, NL63, 229E,  OC43); human metapneumovirus (hMPV); human  enterovirus/rhinovirus (Entero/RV); influenza A; influenza  A/H1: influenza A/H3; influenza A/H1-2009; influenza B;  parainfluenza viruses 1,2,3,4; respiratory syncytial virus;  Mycoplasma pneumoniae; and Chlamydophila pneumoniae.            RADIOLOGY:      < from: CT Abdomen and Pelvis w/ IV Cont (19 @ 23:55) >    FINDINGS:    LOWER CHEST: Trace bilateral pleural effusions with adjacent subsegmental   atelectasis. Right middle lobe and lower lobe groundglass opacities.   Aortic calcifications.    LIVER: Hepatic steatosis.  BILE DUCTS: Normal caliber.  GALLBLADDER: Status post cholecystectomy.  SPLEEN: Within normal limits.  PANCREAS: Within normal limits.  ADRENALS: Within normal limits.  KIDNEYS/URETERS: Within normal limits.    BLADDER: Distended bladder.  REPRODUCTIVE ORGANS: Uterus and adnexa are within normal limits.    BOWEL: No bowel obstruction. Appendix is not definitely seen.  PERITONEUM: No ascites.  VESSELS:  Calcific atherosclerosis of the aorta.  RETROPERITONEUM: No lymphadenopathy.    ABDOMINAL WALL: Ventral hernia wall mesh.  BONES: Mild degenerative changes of the spine.    IMPRESSION: Etiology of the patient's abdominal pain is not elucidated in   this study.    < end of copied text >        < from: Xray Chest 1 View- PORTABLE-Urgent (19 @ 20:09) >    FINDINGS:   The heart size is normal.   The lungs are clear. No pleural effusion or pneumothorax.  No acute osseous findings.    IMPRESSION:   Clear lungs.    < end of copied text >            < from: CT Brain Stroke Protocol (19 @ 18:10) >  IMPRESSION:     No acute intracranial hemorrhage, brain edema, midline shift, mass   effect, or hydrocephalus.     < end of copied text >

## 2019-05-01 NOTE — CONSULT NOTE ADULT - ASSESSMENT
This is an 88F with history of HTN, HLD, DM2, and TIA who presents to the hospital with altered mental status x1 day. Patient currently is AAO x1, unable to fully give history, mostly stating her preference to go home and that she is 88 years old (though she does answer pointed questions appropriately).    As per family (spoke with son Mikie [phone]) patient was in her usual state of health this AM but around 1030AM they had noticed that she was having L arm weakness. A little while later, they had noted that the patient became confused and was not recognizing any of the family members and that she was speaking nonsense. As per the son, the patient had a similar presentation when she had her TIA and the family was worried that she was having another TIA or stroke and therefore brought her to the hospital for eval. The family denies noticing any infectious complaints on the patient or any fevers/chills but the patient states that she has been having a mildly productive cough with nasal congestion and R temporal [place]. She denies any neck stiffness, light/sound sensitivity, nausea/vomiting, abd pain, back pain, dysuria, or extremity/joint pain. No other complaints. As per the son, the patient at baseline is very functional, is able to take care of her bills and living expenditure by herself without any need for help from others.    On arrival to the ED, her vitals were T 100.2 -> 101.2 (rectal), P 80, /92, RR 24, O2 sat 99% RA. Her lab work showed mild leukocytosis, hyponatremia, and an elevated blood lactate that failed to resolve despite significant IVF. She was given vancomycin 1Gg IVPB x1, Zosyn 3.375G IVPB x1, Tylenol 1G IVPB x1, and NS 2.5L. Her mental status initially on arrival to the ED was very poor but it improved during her ED stay though it was still not back to her usual baseline. (30 Apr 2019 03:46)  Lact 4.3.        Recommend:    Sepsis: (fever, leukocytosis, elevated lactate)    - Possible pna.  CTap showed bibasilar GGOs.  Pt c/o dry cough.  Cont zosyn/azithro.  RVP (-)    - f/u blood cultures    - Legionella Ag    - Check CT chest noncontrast to evaluate lung parencyma    - Procalcitonin        Will follow    Alie Santiago  [phone]

## 2019-05-02 ENCOUNTER — TRANSCRIPTION ENCOUNTER (OUTPATIENT)
Age: 84
End: 2019-05-02

## 2019-05-02 DIAGNOSIS — I10 ESSENTIAL (PRIMARY) HYPERTENSION: ICD-10-CM

## 2019-05-02 DIAGNOSIS — A41.9 SEPSIS, UNSPECIFIED ORGANISM: ICD-10-CM

## 2019-05-02 DIAGNOSIS — E11.8 TYPE 2 DIABETES MELLITUS WITH UNSPECIFIED COMPLICATIONS: ICD-10-CM

## 2019-05-02 DIAGNOSIS — E78.5 HYPERLIPIDEMIA, UNSPECIFIED: ICD-10-CM

## 2019-05-02 DIAGNOSIS — E87.1 HYPO-OSMOLALITY AND HYPONATREMIA: ICD-10-CM

## 2019-05-02 LAB
ANION GAP SERPL CALC-SCNC: 16 MMO/L — HIGH (ref 7–14)
BUN SERPL-MCNC: 12 MG/DL — SIGNIFICANT CHANGE UP (ref 7–23)
CALCIUM SERPL-MCNC: 9.2 MG/DL — SIGNIFICANT CHANGE UP (ref 8.4–10.5)
CHLORIDE SERPL-SCNC: 91 MMOL/L — LOW (ref 98–107)
CO2 SERPL-SCNC: 20 MMOL/L — LOW (ref 22–31)
CREAT SERPL-MCNC: 0.81 MG/DL — SIGNIFICANT CHANGE UP (ref 0.5–1.3)
GLUCOSE SERPL-MCNC: 296 MG/DL — HIGH (ref 70–99)
L PNEUMO AG UR QL: NEGATIVE — SIGNIFICANT CHANGE UP
LACTATE SERPL-SCNC: 2.4 MMOL/L — HIGH (ref 0.5–2)
MAGNESIUM SERPL-MCNC: 1.6 MG/DL — SIGNIFICANT CHANGE UP (ref 1.6–2.6)
OSMOLALITY SERPL: 280 MOSMO/KG — SIGNIFICANT CHANGE UP (ref 275–295)
PHOSPHATE SERPL-MCNC: 3 MG/DL — SIGNIFICANT CHANGE UP (ref 2.5–4.5)
POTASSIUM SERPL-MCNC: 4.1 MMOL/L — SIGNIFICANT CHANGE UP (ref 3.5–5.3)
POTASSIUM SERPL-SCNC: 4.1 MMOL/L — SIGNIFICANT CHANGE UP (ref 3.5–5.3)
SODIUM SERPL-SCNC: 127 MMOL/L — LOW (ref 135–145)
TSH SERPL-MCNC: 5.36 UIU/ML — HIGH (ref 0.27–4.2)
URATE SERPL-MCNC: 2.5 MG/DL — SIGNIFICANT CHANGE UP (ref 2.5–7)

## 2019-05-02 PROCEDURE — 71250 CT THORAX DX C-: CPT | Mod: 26

## 2019-05-02 RX ORDER — MAGNESIUM OXIDE 400 MG ORAL TABLET 241.3 MG
400 TABLET ORAL
Qty: 0 | Refills: 0 | Status: DISCONTINUED | OUTPATIENT
Start: 2019-05-02 | End: 2019-05-03

## 2019-05-02 RX ORDER — SODIUM CHLORIDE 9 MG/ML
1000 INJECTION INTRAMUSCULAR; INTRAVENOUS; SUBCUTANEOUS
Qty: 0 | Refills: 0 | Status: DISCONTINUED | OUTPATIENT
Start: 2019-05-02 | End: 2019-05-03

## 2019-05-02 RX ORDER — INSULIN GLARGINE 100 [IU]/ML
10 INJECTION, SOLUTION SUBCUTANEOUS AT BEDTIME
Qty: 0 | Refills: 0 | Status: DISCONTINUED | OUTPATIENT
Start: 2019-05-02 | End: 2019-05-03

## 2019-05-02 RX ORDER — INSULIN LISPRO 100/ML
2 VIAL (ML) SUBCUTANEOUS
Qty: 0 | Refills: 0 | Status: DISCONTINUED | OUTPATIENT
Start: 2019-05-02 | End: 2019-05-03

## 2019-05-02 RX ADMIN — MAGNESIUM OXIDE 400 MG ORAL TABLET 400 MILLIGRAM(S): 241.3 TABLET ORAL at 17:16

## 2019-05-02 RX ADMIN — PIPERACILLIN AND TAZOBACTAM 25 GRAM(S): 4; .5 INJECTION, POWDER, LYOPHILIZED, FOR SOLUTION INTRAVENOUS at 14:58

## 2019-05-02 RX ADMIN — PIPERACILLIN AND TAZOBACTAM 25 GRAM(S): 4; .5 INJECTION, POWDER, LYOPHILIZED, FOR SOLUTION INTRAVENOUS at 21:58

## 2019-05-02 RX ADMIN — SODIUM CHLORIDE 70 MILLILITER(S): 9 INJECTION INTRAMUSCULAR; INTRAVENOUS; SUBCUTANEOUS at 14:59

## 2019-05-02 RX ADMIN — AZITHROMYCIN 250 MILLIGRAM(S): 500 TABLET, FILM COATED ORAL at 03:55

## 2019-05-02 RX ADMIN — PIPERACILLIN AND TAZOBACTAM 25 GRAM(S): 4; .5 INJECTION, POWDER, LYOPHILIZED, FOR SOLUTION INTRAVENOUS at 05:31

## 2019-05-02 RX ADMIN — LOSARTAN POTASSIUM 100 MILLIGRAM(S): 100 TABLET, FILM COATED ORAL at 05:31

## 2019-05-02 RX ADMIN — INSULIN GLARGINE 10 UNIT(S): 100 INJECTION, SOLUTION SUBCUTANEOUS at 21:58

## 2019-05-02 RX ADMIN — Medication 3: at 17:10

## 2019-05-02 RX ADMIN — Medication 2 UNIT(S): at 17:11

## 2019-05-02 RX ADMIN — Medication 3: at 08:16

## 2019-05-02 RX ADMIN — ATORVASTATIN CALCIUM 10 MILLIGRAM(S): 80 TABLET, FILM COATED ORAL at 21:58

## 2019-05-02 NOTE — PROGRESS NOTE ADULT - ASSESSMENT
This is an 88F with history of HTN, HLD, DM2, and TIA who presents to the hospital with altered mental status x1 day. Patient currently is AAO x1, unable to fully give history, mostly stating her preference to go home and that she is 88 years old (though she does answer pointed questions appropriately).    As per family (spoke with son Mikie [phone]) patient was in her usual state of health this AM but around 1030AM they had noticed that she was having L arm weakness. A little while later, they had noted that the patient became confused and was not recognizing any of the family members and that she was speaking nonsense. As per the son, the patient had a similar presentation when she had her TIA and the family was worried that she was having another TIA or stroke and therefore brought her to the hospital for eval. The family denies noticing any infectious complaints on the patient or any fevers/chills but the patient states that she has been having a mildly productive cough with nasal congestion and R temporal [place]. She denies any neck stiffness, light/sound sensitivity, nausea/vomiting, abd pain, back pain, dysuria, or extremity/joint pain. No other complaints. As per the son, the patient at baseline is very functional, is able to take care of her bills and living expenditure by herself without any need for help from others.    On arrival to the ED, her vitals were T 100.2 -> 101.2 (rectal), P 80, /92, RR 24, O2 sat 99% RA. Her lab work showed mild leukocytosis, hyponatremia, and an elevated blood lactate that failed to resolve despite significant IVF. She was given vancomycin 1Gg IVPB x1, Zosyn 3.375G IVPB x1, Tylenol 1G IVPB x1, and NS 2.5L. Her mental status initially on arrival to the ED was very poor but it improved during her ED stay though it was still not back to her usual baseline. (30 Apr 2019 03:46)  Lact 4.3.        Recommend:    Sepsis: (fever, leukocytosis, elevated lactate)    - Possible pna.  CTap showed bibasilar GGOs.  Pt c/o dry cough.    RVP (-), Leg Ag (-).  Bcx NGTD      - CT chest noncontrast reported changes consistent with bibasilar fibrosis.  PCT is low at 0.05.    - Given pt clinically improving and came in with sepsis, will treat for 7 days for possible superimposed pna.  can d/c azithromycin and change to po augmentin 875mg bid through 5/4.  Recommend outpt Pulmonary evaluation for pulmonary fibrosis.         Will follow    Alie Santiago  [phone]

## 2019-05-02 NOTE — CONSULT NOTE ADULT - ATTENDING COMMENTS
elevated lactate  hemodynamically stable  no need for pressors at this time
Patient seen and examined with neurology team and above note reviewed and I agree with assessment and plan as outlined. Patient presented with confusion and generalized fatigue and malaise and was found to have elevated lactate and early sepsis. She was placed on broad spectrum antibiotics and today is doing significantly better and is oriented to place, name and year.  No focal deficits elicited on examination today and at this juncture may hold off on EEG and LP.  May consider MRI brain if she does not continue to improve clinically.  Continue medical management and supportive care and son at bedside and we reassured him and answered his questions.
Renal attd    -SHe does not have SIADH and the elevated blood sugars correct the sodium to about 129.  In any event the lactate is elevated.  NS at 70cc/hr  CT chest to assess for PNA.  Blood and urine cx negative  -CHronic hyponatremia  -Mag supplemented  -Given her age will simply monitor her BP    >60 minutes spent on total encounter and more then 50% of the visit was spent counseling and/or coordinating care by the attending physician

## 2019-05-02 NOTE — DISCHARGE NOTE PROVIDER - CARE PROVIDER_API CALL
Alicia Craft)  Internal Medicine  [address] [place] [address]  Phone: [phone]  Fax: [phone]  Follow Up Time:

## 2019-05-02 NOTE — CONSULT NOTE ADULT - SUBJECTIVE AND OBJECTIVE BOX
NEPHROLOGY    Patient seen and examined.    HPI: 88F with HLD, HTN, DM, TIA p/w weakness and AMS (4/29). Code stroke in ER; no neurological intervention. Found to be febrile upon arrival; started on IV abx and given total NS 2.5 liters. She was initially hyponatremic; which improved but is now trending down again. She is AAOX3. Admits to known history of hyponatremia, as low as 125; she does not know what medication she takes to help this; though she says her primary doctor does manage it. One episode of diarrhea noted overnight. Endorses a good appetite. Her blood pressure is acceptable. She is euvolemic on exam. She has diabetes which she takes metformin for. Found to have lactic acidosis. She complains of long standing intermittent headaches. She has no neurological complaints at this time. No history of kidney disease or urinary issues. She denies chest pain, sob, headache, dizziness, abd pain, n/v/d, dysuria, hematuria, fever or chills.     PAST MEDICAL & SURGICAL HISTORY:  HTN - Hypertension  Hypercholesterolemia  Diabetes mellitus  S/P umbilical hernia repair, follow-up exam  S/P cholecystectomy    MEDICATIONS  (STANDING):  atorvastatin 10 milliGRAM(s) Oral at bedtime  azithromycin  IVPB 500 milliGRAM(s) IV Intermittent every 24 hours  losartan 100 milliGRAM(s) Oral daily  piperacillin/tazobactam IVPB. 3.375 Gram(s) IV Intermittent every 8 hours    ALLERGIES:  naproxen (Nausea)    SOCIAL HISTORY:  Denies alcohol abuse, drug abuse or tobacco usage.     FAMILY HISTORY:  No pertinent family history in first degree relatives    VITALS:  T(C): 36.4 (05-02-19 @ 05:30), Max: 37 (05-01-19 @ 13:06)  HR: 89 (05-02-19 @ 05:30) (80 - 89)  BP: 159/89 (05-02-19 @ 05:30) (145/78 - 159/89)  RR: 18 (05-02-19 @ 05:30) (18 - 18)  SpO2: 98% (05-02-19 @ 05:30) (97% - 98%)    REVIEW OF SYSTEMS:  Denies any nausea, vomiting, fever or chills. Denies chest pain, SOB, focal weakness, hematuria or dysuria. Good oral intake and denies fatigue or weakness. All other pertinent systems are reviewed and are negative.    PHYSICAL EXAM:  Constitutional: NAD  HEENT: EOMI  Neck:  No JVD, supple   Respiratory: diminished B/L  Cardiovascular: S1 and S2, RRR  Gastrointestinal: + BS, soft, NT, ND  Extremities: No peripheral edema  Neurological: AAOX3  Psychiatric: Normal mood  : No Sharma  Skin: No rashes  Access: Not applicable    I and O's:  05-01 @ 07:01  -  05-02 @ 07:00  --------------------------------------------------------  IN: 200 mL / OUT: 0 mL / NET: 200 mL    LABS:                        13.5   7.87  )-----------( 209      ( 01 May 2019 05:30 )             39.9     05-02    127<L>  |  91<L>  |  12  ----------------------------<  296<H>  4.1   |  20<L>  |  0.81    Ca    9.2      02 May 2019 05:50  Phos  3.0     05-02  Mg     1.6     05-02    URINE:  Chloride, Random Urine (04.30.19 @ 06:50)    Chloride, Random Urine: 68: Reference range not established for this test mmol/L  Osmolality, Random Urine: 233 mosmo/kg (04.30.19 @ 06:50)  Sodium, Random Urine (04.30.19 @ 06:50)    Sodium, Random Urine: 76: Reference range not established for this test mmol/L  Creatinine, Random Urine (04.30.19 @ 06:50)    Creatinine, Random Urine: 12.80: * * RANDOM URINARY CREATININE * *    ASSESSMENT  88F with HLD, HTN, DM, TIA p/w weakness and AMS (4/29).   - hyponatremia: ?chronic, euvolemic, corrected for hyperglycemia ~ 132  - HTN: BP mildly elevated  - hypomagnesemia: mild  - endocrine: needs tighter glucose control  - ID: suspect PNA, elevated lactate  - neuro: s/p code stroke, AMS resolved    RECOMMEND  - check uric acid, TSH  - daily BMP while admitted  - follow up chest CT without IV contrast + BCx  - antibiotics per ID - azithro and zosyn  - give mag ox 400 mg po bid x 2 days  - continue losartan 100 daily  - consider endocrine consultation    Babita Thurman, MARTA  [facility]   [phone] NEPHROLOGY    Patient seen and examined.    HPI: 88F with HLD, HTN, DM, TIA p/w weakness and AMS (4/29). Code stroke in ER; no neurological intervention. Found to be febrile upon arrival; started on IV abx and given total NS 2.5 liters. She was initially hyponatremic; which improved but is now trending down again. She is AAOX3. Admits to known history of hyponatremia, as low as 125; she does not know what medication she takes to help this; though she says her primary doctor does manage it. One episode of diarrhea noted overnight. Endorses a good appetite. Her blood pressure is acceptable. She is euvolemic on exam. She has diabetes which she takes metformin for. Found to have lactic acidosis. She complains of long standing intermittent headaches. She has no neurological complaints at this time. No history of kidney disease or urinary issues. She denies chest pain, sob, headache, dizziness, abd pain, n/v/d, dysuria, hematuria, fever or chills.   There is no hematuria or bubbles in the urine.  No history of NSAIDS or nephrolithisis.  The patient urinates once or twice in the night and there is no incontinence.  No family hx or renal disease or back pain.    No recent abx use.  No alleviating or aggravating factors with respect to the kidneys.   PAST MEDICAL & SURGICAL HISTORY:  HTN - Hypertension  Hypercholesterolemia  Diabetes mellitus  S/P umbilical hernia repair, follow-up exam  S/P cholecystectomy    MEDICATIONS  (STANDING):  atorvastatin 10 milliGRAM(s) Oral at bedtime  azithromycin  IVPB 500 milliGRAM(s) IV Intermittent every 24 hours  losartan 100 milliGRAM(s) Oral daily  piperacillin/tazobactam IVPB. 3.375 Gram(s) IV Intermittent every 8 hours    ALLERGIES:  naproxen (Nausea)    SOCIAL HISTORY:  Denies alcohol abuse, drug abuse or tobacco usage.     FAMILY HISTORY:  No pertinent family history in first degree relatives    VITALS:  T(C): 36.4 (05-02-19 @ 05:30), Max: 37 (05-01-19 @ 13:06)  HR: 89 (05-02-19 @ 05:30) (80 - 89)  BP: 159/89 (05-02-19 @ 05:30) (145/78 - 159/89)  RR: 18 (05-02-19 @ 05:30) (18 - 18)  SpO2: 98% (05-02-19 @ 05:30) (97% - 98%)    REVIEW OF SYSTEMS:  Denies any nausea, vomiting, fever or chills. Denies chest pain, SOB, focal weakness, hematuria or dysuria. Good oral intake and denies fatigue or weakness. All other pertinent systems are reviewed and are negative.    PHYSICAL EXAM:  Constitutional: NAD  HEENT: EOMI  Neck:  No JVD, supple   Respiratory: diminished B/L  Cardiovascular: S1 and S2, RRR  Gastrointestinal: + BS, soft, NT, ND  Extremities: No peripheral edema  Neurological: AAOX3  Psychiatric: Normal mood  : No Sharma  Skin: No rashes  Access: Not applicable    I and O's:  05-01 @ 07:01  -  05-02 @ 07:00  --------------------------------------------------------  IN: 200 mL / OUT: 0 mL / NET: 200 mL    LABS:                        13.5   7.87  )-----------( 209      ( 01 May 2019 05:30 )             39.9     05-02    127<L>  |  91<L>  |  12  ----------------------------<  296<H>  4.1   |  20<L>  |  0.81    Ca    9.2      02 May 2019 05:50  Phos  3.0     05-02  Mg     1.6     05-02    URINE:  Chloride, Random Urine (04.30.19 @ 06:50)    Chloride, Random Urine: 68: Reference range not established for this test mmol/L  Osmolality, Random Urine: 233 mosmo/kg (04.30.19 @ 06:50)  Sodium, Random Urine (04.30.19 @ 06:50)    Sodium, Random Urine: 76: Reference range not established for this test mmol/L  Creatinine, Random Urine (04.30.19 @ 06:50)    Creatinine, Random Urine: 12.80: * * RANDOM URINARY CREATININE * *    ASSESSMENT  88F with HLD, HTN, DM, TIA p/w weakness and AMS (4/29).   - hyponatremia: ?chronic, euvolemic, corrected for hyperglycemia ~ 132  - HTN: BP mildly elevated  - hypomagnesemia: mild  - endocrine: needs tighter glucose control  - ID: suspect PNA, elevated lactate  - neuro: s/p code stroke, AMS resolved    RECOMMEND  - check uric acid, TSH  - daily BMP while admitted  - follow up chest CT without IV contrast + BCx  - antibiotics per ID - azithro and zosyn  - give mag ox 400 mg po bid x 2 days  - continue losartan 100 daily  - consider endocrine consultation    Babita Thurman, MARTA  [facility]   [phone]

## 2019-05-02 NOTE — PROGRESS NOTE ADULT - SUBJECTIVE AND OBJECTIVE BOX
Patient is a 88y old  Female who presents with a chief complaint of Altered mental status (02 May 2019 18:22)      SUBJECTIVE / OVERNIGHT EVENTS: Afebrile, no SOB    Review of Systems:   CONSTITUTIONAL: No fever, weight loss, or fatigue  EYES: No eye pain, visual disturbances, or discharge  ENMT:  No difficulty hearing, tinnitus, vertigo; No sinus or throat pain  NECK: No pain or stiffness  BREASTS: No pain, masses, or nipple discharge  RESPIRATORY: No cough, wheezing, chills or hemoptysis; No shortness of breath  CARDIOVASCULAR: No chest pain, palpitations, dizziness, or leg swelling  GASTROINTESTINAL: No abdominal or epigastric pain. No nausea, vomiting, or hematemesis; No diarrhea or constipation. No melena or hematochezia.  GENITOURINARY: No dysuria, frequency, hematuria, or incontinence  NEUROLOGICAL: No headaches, memory loss, loss of strength, numbness, or tremors  SKIN: No itching, burning, rashes, or lesions   LYMPH NODES: No enlarged glands  ENDOCRINE: No heat or cold intolerance; No hair loss  MUSCULOSKELETAL: No joint pain or swelling; No muscle, back, or extremity pain  PSYCHIATRIC: No depression, anxiety, mood swings, or difficulty sleeping  HEME/LYMPH: No easy bruising, or bleeding gums  ALLERY AND IMMUNOLOGIC: No hives or eczema    MEDICATIONS  (STANDING):  atorvastatin 10 milliGRAM(s) Oral at bedtime  azithromycin  IVPB 500 milliGRAM(s) IV Intermittent every 24 hours  azithromycin  IVPB      dextrose 5%. 1000 milliLiter(s) (50 mL/Hr) IV Continuous <Continuous>  dextrose 50% Injectable 12.5 Gram(s) IV Push once  dextrose 50% Injectable 25 Gram(s) IV Push once  dextrose 50% Injectable 25 Gram(s) IV Push once  insulin glargine Injectable (LANTUS) 10 Unit(s) SubCutaneous at bedtime  insulin lispro (HumaLOG) corrective regimen sliding scale   SubCutaneous three times a day before meals  insulin lispro (HumaLOG) corrective regimen sliding scale   SubCutaneous at bedtime  insulin lispro Injectable (HumaLOG) 2 Unit(s) SubCutaneous three times a day before meals  losartan 100 milliGRAM(s) Oral daily  magnesium oxide 400 milliGRAM(s) Oral two times a day with meals  piperacillin/tazobactam IVPB. 3.375 Gram(s) IV Intermittent every 8 hours  sodium chloride 0.9%. 1000 milliLiter(s) (70 mL/Hr) IV Continuous <Continuous>    MEDICATIONS  (PRN):  dextrose 40% Gel 15 Gram(s) Oral once PRN Blood Glucose LESS THAN 70 milliGRAM(s)/deciliter  glucagon  Injectable 1 milliGRAM(s) IntraMuscular once PRN Glucose LESS THAN 70 milligrams/deciliter      PHYSICAL EXAM:  Vital Signs Last 24 Hrs  T(C): 37 (02 May 2019 20:38), Max: 37 (02 May 2019 20:38)  T(F): 98.6 (02 May 2019 20:38), Max: 98.6 (02 May 2019 20:38)  HR: 86 (02 May 2019 20:38) (86 - 89)  BP: 148/60 (02 May 2019 20:38) (148/60 - 159/89)  BP(mean): --  RR: 18 (02 May 2019 20:38) (18 - 18)  SpO2: 100% (02 May 2019 20:38) (98% - 100%)  I&O's Summary    01 May 2019 07:01  -  02 May 2019 07:00  --------------------------------------------------------  IN: 200 mL / OUT: 0 mL / NET: 200 mL    02 May 2019 07:01  -  02 May 2019 21:38  --------------------------------------------------------  IN: 400 mL / OUT: 0 mL / NET: 400 mL      GENERAL: NAD, well-developed  HEAD:  Atraumatic, Normocephalic  EYES: EOMI, PERRLA, conjunctiva and sclera clear  NECK: Supple, No JVD  CHEST/LUNG: Clear to auscultation bilaterally; No wheeze  HEART: Regular rate and rhythm; No murmurs, rubs, or gallops  ABDOMEN: Soft, Nontender, Nondistended; Bowel sounds present  EXTREMITIES:  2+ Peripheral Pulses, No clubbing, cyanosis, or edema  PSYCH: AAOx3  NEUROLOGY: non-focal  SKIN: No rashes or lesions    LABS:  CAPILLARY BLOOD GLUCOSE      POCT Blood Glucose.: 261 mg/dL (02 May 2019 16:55)  POCT Blood Glucose.: 226 mg/dL (02 May 2019 13:04)  POCT Blood Glucose.: 302 mg/dL (02 May 2019 11:30)  POCT Blood Glucose.: 259 mg/dL (02 May 2019 08:11)  POCT Blood Glucose.: 277 mg/dL (02 May 2019 08:09)  POCT Blood Glucose.: 240 mg/dL (01 May 2019 21:39)                          13.5   7.87  )-----------( 209      ( 01 May 2019 05:30 )             39.9     05-02    127<L>  |  91<L>  |  12  ----------------------------<  296<H>  4.1   |  20<L>  |  0.81    Ca    9.2      02 May 2019 05:50  Phos  3.0     05-02  Mg     1.6     05-02                RADIOLOGY & ADDITIONAL TESTS:    Imaging Personally Reviewed:    Consultant(s) Notes Reviewed:      Care Discussed with Consultants/Other Providers:

## 2019-05-02 NOTE — PROGRESS NOTE ADULT - SUBJECTIVE AND OBJECTIVE BOX
Infectious Diseases progress note:    Subjective:  NAD, feeling better.  Has mild dry cough.  No sob.  Son at bedside.     ROS:  CONSTITUTIONAL:  No fever, chills, rigors  CARDIOVASCULAR:  No chest pain or palpitations  RESPIRATORY:   No SOB, cough, dyspnea on exertion.  No wheezing  GASTROINTESTINAL:  No abd pain, N/V, diarrhea/constipation  EXTREMITIES:  No swelling or joint pain  GENITOURINARY:  No burning on urination, increased frequency or urgency.  No flank pain  NEUROLOGIC:  No HA, visual disturbances  SKIN: No rashes    Allergies    naproxen (Nausea)    Intolerances        ANTIBIOTICS/RELEVANT:  antimicrobials  azithromycin  IVPB 500 milliGRAM(s) IV Intermittent every 24 hours  azithromycin  IVPB      piperacillin/tazobactam IVPB. 3.375 Gram(s) IV Intermittent every 8 hours    immunologic:    OTHER:  atorvastatin 10 milliGRAM(s) Oral at bedtime  dextrose 40% Gel 15 Gram(s) Oral once PRN  dextrose 5%. 1000 milliLiter(s) IV Continuous <Continuous>  dextrose 50% Injectable 12.5 Gram(s) IV Push once  dextrose 50% Injectable 25 Gram(s) IV Push once  dextrose 50% Injectable 25 Gram(s) IV Push once  glucagon  Injectable 1 milliGRAM(s) IntraMuscular once PRN  insulin glargine Injectable (LANTUS) 10 Unit(s) SubCutaneous at bedtime  insulin lispro (HumaLOG) corrective regimen sliding scale   SubCutaneous three times a day before meals  insulin lispro (HumaLOG) corrective regimen sliding scale   SubCutaneous at bedtime  insulin lispro Injectable (HumaLOG) 2 Unit(s) SubCutaneous three times a day before meals  losartan 100 milliGRAM(s) Oral daily  magnesium oxide 400 milliGRAM(s) Oral two times a day with meals  sodium chloride 0.9%. 1000 milliLiter(s) IV Continuous <Continuous>      Objective:  Vital Signs Last 24 Hrs  T(C): 37 (02 May 2019 20:38), Max: 37 (02 May 2019 20:38)  T(F): 98.6 (02 May 2019 20:38), Max: 98.6 (02 May 2019 20:38)  HR: 86 (02 May 2019 20:38) (86 - 89)  BP: 148/60 (02 May 2019 20:38) (148/60 - 159/89)  BP(mean): --  RR: 18 (02 May 2019 20:38) (18 - 18)  SpO2: 100% (02 May 2019 20:38) (98% - 100%)    PHYSICAL EXAM:  Constitutional:NAD  Eyes:WILLI, EOMI  Ear/Nose/Throat: no thrush, mucositis.  Moist mucous membranes	  Neck:no JVD, no lymphadenopathy, supple  Respiratory: CTA libby  Cardiovascular: S1S2 RRR, no murmurs  Gastrointestinal:soft, nontender,  nondistended (+) BS  Extremities:no e/e/c  Skin:  no rashes, open wounds or ulcerations        LABS:                        13.5   7.87  )-----------( 209      ( 01 May 2019 05:30 )             39.9     05-02    127<L>  |  91<L>  |  12  ----------------------------<  296<H>  4.1   |  20<L>  |  0.81    Ca    9.2      02 May 2019 05:50  Phos  3.0     05-02  Mg     1.6     05-02            Procalcitonin, Serum: 0.05 (05-01 @ 05:30)                    MICROBIOLOGY:    Culture - Urine (04.29.19 @ 19:32)    Culture - Urine:   NO GROWTH AT 24 HOURS    Specimen Source: URINE MIDSTREAM    Culture - Blood (04.29.19 @ 19:28)    Culture - Blood:   NO ORGANISMS ISOLATED  NO ORGANISMS ISOLATED AT 72 HRS.    Specimen Source: BLOOD VENOUS    Culture - Blood (04.29.19 @ 19:28)    Culture - Blood:   NO ORGANISMS ISOLATED  NO ORGANISMS ISOLATED AT 72 HRS.    Specimen Source: BLOOD PERIPHERAL      Legionella pneumophila Antigen, Urine: Negative (05.01.19 @ 17:54)        RADIOLOGY & ADDITIONAL STUDIES:    < from: CT Chest No Cont (05.02.19 @ 12:15) >  EXAM:  CT CHEST        PROCEDURE DATE:  May  2 2019         INTERPRETATION:  Reason for Exam:  Fever and cough.    CT of the chest was performed from the thoracic inlet to the level of the   adrenal glands without contrast injection.    Comparison:Abdominal CT scan dated April 29, 2019    Tubes/Lines:     None.    Mediastinum/Vessels/Heart:     Aorta and pulmonary arteries are normal in   size. There is no pericardial effusion. No lymphadenopathy. Thyroid gland   is unremarkable    Lungs/Pleura/Airways: No consolidations are noted. Bilateral peripheral   reticular opacities are noted with minimal traction bronchiectasis.   Findings consistent with fibrosis without significant change from   previous exam.    Visualized abdomen:     Unremarkable noncontrast appearance of the upper   abdomen    Bones and soft tissues:     No suspicious osseous lesions. Degenerative   changes noted throughout the spine.    IMPRESSION:    No consolidations or edema.    Bibasilar fibrosis without significantchange from previous study.    < end of copied text >

## 2019-05-02 NOTE — DISCHARGE NOTE PROVIDER - HOSPITAL COURSE
88 F PMHx HTN, HLD, DM2, TIA p/w L arm weakness, AMS. Pt endorses mild productive cough, nasal congestion, R temporal headache. Admitted for AMS likely 2/2 metabolic encephalopathy due to sepsis, hyponatremia. In ED, T 100.2 (oral), 101.2 (rectal). Labs with leukocytosis, elevated lactate. S/P Vanco/Zosyn, 2.5 L NS. CTH negative. CT A/P with trace B/L pleural effusions with subsegmental atelectasis, RML and LLL GGO.             Encephalopathy.      -Possibly 2/2 sepsis/hyponatremia    -CT BRAIN:    No acute intracranial hemorrhage, brain edema, midline shift, mass     effect, or hydrocephalus.     -If mental status worsens, consider CTA H/N, MRI head        Sepsis    -ID following     -Likely 2/2 RML/LLL PNA     -Zosyn, Azithromycin; will hold off Vanco for now; switch to p.o. azithro and augmentin to complete 7 day course.     -Blood cx: NTD x 48hrs; Urine Cx: NTD     -CT abd and pelvis:CTap showed bibasilar GGOs.  Etiology of the patient's abdominal pain is not elucidated in this study.    -procalcitonin: 0.05 WNL     -F/u urine legionella --------------------------    -fever, leukocytosis, elevated lactate    - Possible pna.  Pt c/o dry cough.      -RVP (-)    - Check CT chest noncontrast to evaluate lung parencyma:No consolidations or edema.Bibasilar fibrosis without significant change from previous study.        Hyponatremia.      -renal consulted    - ?chronic, euvolemic, corrected for hyperglycemia ~ 132    -- check uric acid, TSH-------------    - daily BMP while admitted    - follow up chest CT without IV contrast + BCx    - antibiotics per ID - azithro and zosyn    - give mag ox 400 mg po bid x 2 days    - continue losartan 100 daily    -serum osmo : 280         Lactate blood increased.      -BP WNL, doubt hemodynamically mediated    -Possibly 2/2 Metformin usage     -cont to monitor         Essential hypertension, elevated on admission     -Losartan         Type 2 diabetes mellitus with complication, unspecified whether long term insulin use.     -A1c 8.4     -ISS    -5/1 added lantus         Hyperlipidemia    -Statin         DVT PPX     -SCDs     -Fall precautions.         Dispo: Home with Home Health Aides 88 F PMHx HTN, HLD, DM2, TIA p/w L arm weakness, AMS. Pt endorses mild productive cough, nasal congestion, R temporal headache. Admitted for AMS likely 2/2 metabolic encephalopathy due to sepsis, hyponatremia. In ED, T 100.2 (oral), 101.2 (rectal). Labs with leukocytosis, elevated lactate. S/P Vanco/Zosyn, 2.5 L NS. CTH negative. CT A/P with trace B/L pleural effusions with subsegmental atelectasis, RML and LLL GGO.         Encephalopathy: Possibly 2/2 sepsis/hyponatremia; CT head with no acute findings; If mental status worsens, consider CTA H/N, MRI head        Sepsis: Infectious disease consulted and patient treated for pneumonia likely although CT chest revealed no consolidation; Treated with Zosyn & Azithromycin inpatient and can be transitioned to PO Azithro and augmentin to complete 7 day course; Blood cx: NTD x 48hrs; Urine Cx: NTD; CT abd/pelvis: Bibasilar GGOs; Legionella negative; RVP negative         Hyponatremia: Renal consulted; BMP monitored closely and patient given fluids; Serum osmo: 280         Lactate increased: Possibly 2/2 Metformin usage; Given fluids and levels normalized        Essential hypertension: Monitored BP and continued with Losartan         Type 2 diabetes mellitus: Sugars high during admission; Patient on Basaglar and oral regimen at home; During hospitalization placed on sliding insulin scale, as well as, Humalog and Lantus, which was titrated upwards for better sugar control; A1c 8.4 %        Hyperlipidemia: Continued with Statin         Dispo: Home with Home Health Aides

## 2019-05-02 NOTE — DISCHARGE NOTE PROVIDER - NSDCCPCAREPLAN_GEN_ALL_CORE_FT
PRINCIPAL DISCHARGE DIAGNOSIS  Diagnosis: Encephalopathy  Assessment and Plan of Treatment: -Possibly 2/2 sepsis/hyponatremia  -CT BRAIN:  No acute intracranial hemorrhage, brain edema, midline shift, mass   effect, or hydrocephalus.   -Resolved      SECONDARY DISCHARGE DIAGNOSES  Diagnosis: Sepsis  Assessment and Plan of Treatment: Please follow up with your pcp within 1 week of discharge.  Please call to make an appointment within 1 week of discharge.  Please continue antibiotics as prescribed.    Diagnosis: Pneumonia  Assessment and Plan of Treatment: Please follow up with your pcp within 1 week of discharge.  Please call to make an appointment within 1 week of discharge.  Please continue antibiotics as prescribed.    Diagnosis: Lactate blood increased  Assessment and Plan of Treatment: Please follow up with your pcp within 1 week of discharge.  Please call to make an appointment. Your repeat lactate--------------------------    Diagnosis: Hyperlipidemia, unspecified hyperlipidemia type  Assessment and Plan of Treatment: Please follow up with your pcp within 1 week of discharge.  please call to make an appointment within 1 week of discharge. Continue medications as prescribed.    Diagnosis: Type 2 diabetes mellitus with complication, unspecified whether long term insulin use  Assessment and Plan of Treatment: Please follow up with your pcp within 1 week of discharge.  please call to make an appointment within 1 week of discharge.  Please continue youe insulin and oral regimen as prescribed.  Please monitor your fingersticks.    Diagnosis: Essential hypertension  Assessment and Plan of Treatment: Please follow up with your pcp within 1 week of discharge.  Please call to make an appointment within 1 week of discharge.  Please continue your medications as prescribed.  Please check your blood pressure prior to taking your blood pressure medications    Diagnosis: Type 2 diabetes mellitus with complication, unspecified whether long term insulin use  Assessment and Plan of Treatment: Please follow up with your pcp within 1 week of discharge.  please call to make an appointment within 1 week of discharge.  Please continue youe insulin and oral regimen as prescribed.  Please monitor your fingersticks.    Diagnosis: Essential hypertension  Assessment and Plan of Treatment: Please follow up with your pcp within 1 week of discharge.  Please call to make an appointment within 1 week of discharge.  Please continue your medications as prescribed.  Please check your blood pressure prior to taking your blood pressure medications    Diagnosis: Hyponatremia  Assessment and Plan of Treatment: Most likely chronic.  You were seen by the nephrologist here at the hospital.  Please follow up with your pcp within 1 week of discharge.  Please call to make an appointment within 1 week of discharge.  Please check your sodium within 1 week of discharge. PRINCIPAL DISCHARGE DIAGNOSIS  Diagnosis: Encephalopathy  Assessment and Plan of Treatment: No findigs noted on CT scan of the head. Mental status has improved to baseline and you were treated for pneumonia and low sodium levels. Please follow-up with your primary care provider as outpatient for further monitoring.      SECONDARY DISCHARGE DIAGNOSES  Diagnosis: Pneumonia  Assessment and Plan of Treatment: A course of antibiotics was initiated during your hospital stay - please continue as prescribed. Monitor for worsening of disease, such as, increased cough/sputum, difficulty breathing, fever/chills, or changes in mental status. Follow-up with your PCP as an outpatient for further medical care/recommendations.    Diagnosis: Hyponatremia  Assessment and Plan of Treatment: Most likely chronic.  You were seen by the nephrologist here at the hospital.  Please follow up with your primary provider within 1 week of discharge to recheck your sodium levels.    Diagnosis: Lactate blood increased  Assessment and Plan of Treatment: Resolved and likely due to infection - You were given fluids.

## 2019-05-03 ENCOUNTER — TRANSCRIPTION ENCOUNTER (OUTPATIENT)
Age: 84
End: 2019-05-03

## 2019-05-03 VITALS
TEMPERATURE: 98 F | SYSTOLIC BLOOD PRESSURE: 160 MMHG | DIASTOLIC BLOOD PRESSURE: 91 MMHG | OXYGEN SATURATION: 100 % | RESPIRATION RATE: 18 BRPM | HEART RATE: 72 BPM

## 2019-05-03 LAB
ALBUMIN SERPL ELPH-MCNC: 3.9 G/DL — SIGNIFICANT CHANGE UP (ref 3.3–5)
ALP SERPL-CCNC: 45 U/L — SIGNIFICANT CHANGE UP (ref 40–120)
ALT FLD-CCNC: 15 U/L — SIGNIFICANT CHANGE UP (ref 4–33)
ANION GAP SERPL CALC-SCNC: 14 MMO/L — SIGNIFICANT CHANGE UP (ref 7–14)
ANION GAP SERPL CALC-SCNC: 14 MMO/L — SIGNIFICANT CHANGE UP (ref 7–14)
AST SERPL-CCNC: 23 U/L — SIGNIFICANT CHANGE UP (ref 4–32)
BILIRUB SERPL-MCNC: 0.5 MG/DL — SIGNIFICANT CHANGE UP (ref 0.2–1.2)
BUN SERPL-MCNC: 11 MG/DL — SIGNIFICANT CHANGE UP (ref 7–23)
BUN SERPL-MCNC: 11 MG/DL — SIGNIFICANT CHANGE UP (ref 7–23)
CALCIUM SERPL-MCNC: 8.7 MG/DL — SIGNIFICANT CHANGE UP (ref 8.4–10.5)
CALCIUM SERPL-MCNC: 8.7 MG/DL — SIGNIFICANT CHANGE UP (ref 8.4–10.5)
CHLORIDE SERPL-SCNC: 96 MMOL/L — LOW (ref 98–107)
CHLORIDE SERPL-SCNC: 96 MMOL/L — LOW (ref 98–107)
CO2 SERPL-SCNC: 23 MMOL/L — SIGNIFICANT CHANGE UP (ref 22–31)
CO2 SERPL-SCNC: 23 MMOL/L — SIGNIFICANT CHANGE UP (ref 22–31)
CREAT SERPL-MCNC: 0.83 MG/DL — SIGNIFICANT CHANGE UP (ref 0.5–1.3)
CREAT SERPL-MCNC: 0.83 MG/DL — SIGNIFICANT CHANGE UP (ref 0.5–1.3)
GLUCOSE SERPL-MCNC: 221 MG/DL — HIGH (ref 70–99)
GLUCOSE SERPL-MCNC: 221 MG/DL — HIGH (ref 70–99)
HCT VFR BLD CALC: 41.4 % — SIGNIFICANT CHANGE UP (ref 34.5–45)
HGB BLD-MCNC: 13.7 G/DL — SIGNIFICANT CHANGE UP (ref 11.5–15.5)
LACTATE SERPL-SCNC: 1.7 MMOL/L — SIGNIFICANT CHANGE UP (ref 0.5–2)
MAGNESIUM SERPL-MCNC: 1.7 MG/DL — SIGNIFICANT CHANGE UP (ref 1.6–2.6)
MCHC RBC-ENTMCNC: 30.4 PG — SIGNIFICANT CHANGE UP (ref 27–34)
MCHC RBC-ENTMCNC: 33.1 % — SIGNIFICANT CHANGE UP (ref 32–36)
MCV RBC AUTO: 92 FL — SIGNIFICANT CHANGE UP (ref 80–100)
NRBC # FLD: 0 K/UL — SIGNIFICANT CHANGE UP (ref 0–0)
PHOSPHATE SERPL-MCNC: 2.8 MG/DL — SIGNIFICANT CHANGE UP (ref 2.5–4.5)
PLATELET # BLD AUTO: 214 K/UL — SIGNIFICANT CHANGE UP (ref 150–400)
PMV BLD: 9.4 FL — SIGNIFICANT CHANGE UP (ref 7–13)
POTASSIUM SERPL-MCNC: 4.3 MMOL/L — SIGNIFICANT CHANGE UP (ref 3.5–5.3)
POTASSIUM SERPL-MCNC: 4.3 MMOL/L — SIGNIFICANT CHANGE UP (ref 3.5–5.3)
POTASSIUM SERPL-SCNC: 4.3 MMOL/L — SIGNIFICANT CHANGE UP (ref 3.5–5.3)
POTASSIUM SERPL-SCNC: 4.3 MMOL/L — SIGNIFICANT CHANGE UP (ref 3.5–5.3)
PROT SERPL-MCNC: 7.7 G/DL — SIGNIFICANT CHANGE UP (ref 6–8.3)
RBC # BLD: 4.5 M/UL — SIGNIFICANT CHANGE UP (ref 3.8–5.2)
RBC # FLD: 12.4 % — SIGNIFICANT CHANGE UP (ref 10.3–14.5)
SODIUM SERPL-SCNC: 133 MMOL/L — LOW (ref 135–145)
SODIUM SERPL-SCNC: 133 MMOL/L — LOW (ref 135–145)
T3 SERPL-MCNC: 83.3 NG/DL — SIGNIFICANT CHANGE UP (ref 80–200)
T4 AB SER-ACNC: 7.68 UG/DL — SIGNIFICANT CHANGE UP (ref 5.1–13)
TSH SERPL-MCNC: 4.22 UIU/ML — HIGH (ref 0.27–4.2)
URATE SERPL-MCNC: 2.9 MG/DL — SIGNIFICANT CHANGE UP (ref 2.5–7)
WBC # BLD: 8.29 K/UL — SIGNIFICANT CHANGE UP (ref 3.8–10.5)
WBC # FLD AUTO: 8.29 K/UL — SIGNIFICANT CHANGE UP (ref 3.8–10.5)

## 2019-05-03 RX ORDER — METFORMIN HYDROCHLORIDE 850 MG/1
1 TABLET ORAL
Qty: 0 | Refills: 0 | COMMUNITY

## 2019-05-03 RX ORDER — SITAGLIPTIN 50 MG/1
1 TABLET, FILM COATED ORAL
Qty: 30 | Refills: 0 | OUTPATIENT
Start: 2019-05-03

## 2019-05-03 RX ADMIN — Medication 1 TABLET(S): at 05:37

## 2019-05-03 RX ADMIN — MAGNESIUM OXIDE 400 MG ORAL TABLET 400 MILLIGRAM(S): 241.3 TABLET ORAL at 11:33

## 2019-05-03 RX ADMIN — LOSARTAN POTASSIUM 100 MILLIGRAM(S): 100 TABLET, FILM COATED ORAL at 05:37

## 2019-05-03 RX ADMIN — Medication 2: at 11:53

## 2019-05-03 RX ADMIN — Medication 2 UNIT(S): at 08:15

## 2019-05-03 RX ADMIN — Medication 2 UNIT(S): at 11:54

## 2019-05-03 RX ADMIN — Medication 2: at 08:14

## 2019-05-03 NOTE — PROGRESS NOTE ADULT - SUBJECTIVE AND OBJECTIVE BOX
NEPHROLOGY    Patient seen and examined.  NAD, no complaints    MEDICATIONS  (STANDING):  amoxicillin  875 milliGRAM(s)/clavulanate 1 Tablet(s) Oral two times a day  atorvastatin 10 milliGRAM(s) Oral at bedtime  losartan 100 milliGRAM(s) Oral daily  magnesium oxide 400 milliGRAM(s) Oral two times a day with meals  sodium chloride 0.9%. 1000 milliLiter(s) (70 mL/Hr) IV Continuous <Continuous>    VITALS:  T(C): , Max: 37 (05-02-19 @ 20:38)  T(F): , Max: 98.6 (05-02-19 @ 20:38)  HR: 72 (05-03-19 @ 13:48)  BP: 160/91 (05-03-19 @ 13:48)  RR: 18 (05-03-19 @ 13:48)  SpO2: 100% (05-03-19 @ 13:48)    05-02 @ 07:01  -  05-03 @ 07:00  --------------------------------------------------------  IN: 400 mL / OUT: 0 mL / NET: 400 mL    REVIEW OF SYSTEMS:  Full ROS done and were negative unless otherwise indicated in HPI/assessment.     PHYSICAL EXAM:  Constitutional: NAD  Respiratory: CTA B/L  Cardiovascular: S1 and S2, RRR  Gastrointestinal: + BS, soft, NT, ND  Extremities: no peripheral edema  Neurological: AAO x 3  : no aguero  Access: HD tunneled catheter, temp HD catheter, AVG/AVF    LABS:                        13.7   8.29  )-----------( 214      ( 03 May 2019 06:43 )             41.4     05-03    133<L>  |  96<L>  |  11  ----------------------------<  221<H>  4.3   |  23  |  0.83    Ca    8.7      03 May 2019 06:43  Phos  2.8     05-03  Mg     1.7     05-03    TPro  7.7  /  Alb  3.9  /  TBili  0.5  /  DBili  x   /  AST  23  /  ALT  15  /  AlkPhos  45  05-03    RADIOLOGY  < from: CT Chest No Cont (05.02.19 @ 12:15) >  IMPRESSION:    No consolidations or edema.    Bibasilar fibrosis without significantchange from previous study.    < end of copied text >    ASSESSMENT  88F with HLD, HTN, DM, TIA p/w weakness and AMS (4/29).   - hyponatremia: ?chronic - euvolemic - acceptable when corrected for hyperglycemia  - HTN: BP elevated  - hypomagnesemia: mild, supplemented   - endocrine: needs tighter glucose control  - ID: elevated lactate; on augmentin  - neuro: s/p code stroke, AMS resolved    RECOMMEND  - daily BMP while admitted  - antibiotics per ID   - mag ox 400 mg po bid day 2/2  - continue losartan 100 daily  - dc planning per primary team    Babita Thurman NP  [facility]  [phone] NEPHROLOGY    Patient seen and examined.  NAD, no complaints    MEDICATIONS  (STANDING):  amoxicillin  875 milliGRAM(s)/clavulanate 1 Tablet(s) Oral two times a day  atorvastatin 10 milliGRAM(s) Oral at bedtime  losartan 100 milliGRAM(s) Oral daily  magnesium oxide 400 milliGRAM(s) Oral two times a day with meals  sodium chloride 0.9%. 1000 milliLiter(s) (70 mL/Hr) IV Continuous <Continuous>    VITALS:  T(C): , Max: 37 (05-02-19 @ 20:38)  T(F): , Max: 98.6 (05-02-19 @ 20:38)  HR: 72 (05-03-19 @ 13:48)  BP: 160/91 (05-03-19 @ 13:48)  RR: 18 (05-03-19 @ 13:48)  SpO2: 100% (05-03-19 @ 13:48)    05-02 @ 07:01  -  05-03 @ 07:00  --------------------------------------------------------  IN: 400 mL / OUT: 0 mL / NET: 400 mL    REVIEW OF SYSTEMS:  Full ROS done and were negative unless otherwise indicated in HPI/assessment.     PHYSICAL EXAM:  Constitutional: NAD  Respiratory: CTA B/L  Cardiovascular: S1 and S2, RRR  Gastrointestinal: + BS, soft, NT, ND  Extremities: no peripheral edema  Neurological: AAO x 3  : no aguero  Access: HD tunneled catheter, temp HD catheter, AVG/AVF    LABS:                        13.7   8.29  )-----------( 214      ( 03 May 2019 06:43 )             41.4     05-03    133<L>  |  96<L>  |  11  ----------------------------<  221<H>  4.3   |  23  |  0.83    Ca    8.7      03 May 2019 06:43  Phos  2.8     05-03  Mg     1.7     05-03    TPro  7.7  /  Alb  3.9  /  TBili  0.5  /  DBili  x   /  AST  23  /  ALT  15  /  AlkPhos  45  05-03    RADIOLOGY  < from: CT Chest No Cont (05.02.19 @ 12:15) >  IMPRESSION:    No consolidations or edema.    Bibasilar fibrosis without significantchange from previous study.    < end of copied text >    ASSESSMENT  88F with HLD, HTN, DM, TIA p/w weakness and AMS (4/29).   - hyponatremia: ?chronic - euvolemic - acceptable when corrected for hyperglycemia  - HTN: BP acceptable given her age  - hypomagnesemia: mild, supplemented   - endocrine: needs tighter glucose control  - ID: elevated lactate; on augmentin  - neuro: s/p code stroke, AMS resolved    RECOMMEND  - daily BMP while admitted  - antibiotics per ID   - mag ox 400 mg po bid day 2/2  - continue losartan 100 daily  - dc planning per primary team    Babita Thurman NP  [facility] [place]  [phone]

## 2019-05-03 NOTE — DISCHARGE NOTE NURSING/CASE MANAGEMENT/SOCIAL WORK - NSSCNAMETXT_GEN_ALL_CORE
[facility]:Aide reinstated by care manager Guillermina at [facility] for Start of care 5/4/2019

## 2019-05-03 NOTE — CHART NOTE - NSCHARTNOTEFT_GEN_A_CORE
Spoke to Endocrine team - Patient can be discharged on Basaglar (Home med) 14 units at bedtime and oral agent Januvia 100mg QD - Sent to [employer] to see if covered - Called son to inform patient ready for DC

## 2019-05-03 NOTE — DISCHARGE NOTE NURSING/CASE MANAGEMENT/SOCIAL WORK - NSDCDPATPORTLINK_GEN_ALL_CORE
You can access the [facility] Patient Portal, offered by [facility], by registering with the following website: [email or web address]

## 2019-05-03 NOTE — PHYSICAL THERAPY INITIAL EVALUATION ADULT - PATIENT PROFILE REVIEW, REHAB EVAL
Pt. profile reviewed, consulted with CLARITA VANCE prior to initial PT evaluation and tx, as per RN, Pt. is OK to participate in skilled therapy session; as per team pt. has been ambulating on unit and ok to ambulate with therapy./yes

## 2019-05-03 NOTE — PHYSICAL THERAPY INITIAL EVALUATION ADULT - PERTINENT HX OF CURRENT PROBLEM, REHAB EVAL
Pt. is a 88 year old female admitted to [facility] due to fever and AMS. PMH: HTN, diabetes mellitus, Hypercholesterolemia.

## 2019-05-03 NOTE — PROGRESS NOTE ADULT - ASSESSMENT
This is an 88F with history of HTN, HLD, DM2, and TIA who presents to the hospital with altered mental status x1 day. Patient currently is AAO x1, unable to fully give history, mostly stating her preference to go home and that she is 88 years old (though she does answer pointed questions appropriately).    As per family (spoke with son Mikie [phone]) patient was in her usual state of health this AM but around 1030AM they had noticed that she was having L arm weakness. A little while later, they had noted that the patient became confused and was not recognizing any of the family members and that she was speaking nonsense. As per the son, the patient had a similar presentation when she had her TIA and the family was worried that she was having another TIA or stroke and therefore brought her to the hospital for eval. The family denies noticing any infectious complaints on the patient or any fevers/chills but the patient states that she has been having a mildly productive cough with nasal congestion and R temporal [place]. She denies any neck stiffness, light/sound sensitivity, nausea/vomiting, abd pain, back pain, dysuria, or extremity/joint pain. No other complaints. As per the son, the patient at baseline is very functional, is able to take care of her bills and living expenditure by herself without any need for help from others.    On arrival to the ED, her vitals were T 100.2 -> 101.2 (rectal), P 80, /92, RR 24, O2 sat 99% RA. Her lab work showed mild leukocytosis, hyponatremia, and an elevated blood lactate that failed to resolve despite significant IVF. She was given vancomycin 1Gg IVPB x1, Zosyn 3.375G IVPB x1, Tylenol 1G IVPB x1, and NS 2.5L. Her mental status initially on arrival to the ED was very poor but it improved during her ED stay though it was still not back to her usual baseline. (30 Apr 2019 03:46)  Lact 4.3.        Recommend:    Sepsis: (fever, leukocytosis, elevated lactate)    - Possible pna.  CTap showed bibasilar GGOs.  Pt c/o dry cough.    RVP (-), Leg Ag (-).  Bcx NGTD      - CT chest noncontrast reported changes consistent with bibasilar fibrosis.  PCT is low at 0.05.    - Given pt clinically improving and came in with sepsis, will treat for 7 days for possible superimposed pna.  can d/c azithromycin and change to po augmentin 875mg bid through 5/4.  Recommend outpt Pulmonary evaluation for pulmonary fibrosis.     OK for d/c from ID standpoint        Alie Santiago  [phone]

## 2019-05-03 NOTE — PHYSICAL THERAPY INITIAL EVALUATION ADULT - DISCHARGE DISPOSITION, PT EVAL
anticipated discharge to home with no skilled restorative physical therapy services upon discharge from [facility]. Please follow therapy for continued assessment.

## 2019-05-03 NOTE — PHYSICAL THERAPY INITIAL EVALUATION ADULT - ASR EQUIP NEEDS DISCH PT EVAL
rolling walker (5 inch wheels)/pt. would benefit from use of Rolling Walker or Rollator upon D/C to optimize safety within the home and decrease fall risk./rollator

## 2019-05-03 NOTE — PROGRESS NOTE ADULT - SUBJECTIVE AND OBJECTIVE BOX
Infectious Diseases progress note:    Subjective: NAD, feeling better.  No sob, cp, cough, fever.     ROS:  CONSTITUTIONAL:  No fever, chills, rigors  CARDIOVASCULAR:  No chest pain or palpitations  RESPIRATORY:   No SOB, cough, dyspnea on exertion.  No wheezing  GASTROINTESTINAL:  No abd pain, N/V, diarrhea/constipation  EXTREMITIES:  No swelling or joint pain  GENITOURINARY:  No burning on urination, increased frequency or urgency.  No flank pain  NEUROLOGIC:  No HA, visual disturbances  SKIN: No rashes    Allergies    naproxen (Nausea)    Intolerances        ANTIBIOTICS/RELEVANT:  antimicrobials  amoxicillin  875 milliGRAM(s)/clavulanate 1 Tablet(s) Oral two times a day    immunologic:    OTHER:  atorvastatin 10 milliGRAM(s) Oral at bedtime  dextrose 40% Gel 15 Gram(s) Oral once PRN  dextrose 5%. 1000 milliLiter(s) IV Continuous <Continuous>  dextrose 50% Injectable 12.5 Gram(s) IV Push once  dextrose 50% Injectable 25 Gram(s) IV Push once  dextrose 50% Injectable 25 Gram(s) IV Push once  glucagon  Injectable 1 milliGRAM(s) IntraMuscular once PRN  insulin glargine Injectable (LANTUS) 10 Unit(s) SubCutaneous at bedtime  insulin lispro (HumaLOG) corrective regimen sliding scale   SubCutaneous three times a day before meals  insulin lispro (HumaLOG) corrective regimen sliding scale   SubCutaneous at bedtime  insulin lispro Injectable (HumaLOG) 2 Unit(s) SubCutaneous three times a day before meals  losartan 100 milliGRAM(s) Oral daily  magnesium oxide 400 milliGRAM(s) Oral two times a day with meals  sodium chloride 0.9%. 1000 milliLiter(s) IV Continuous <Continuous>      Objective:  Vital Signs Last 24 Hrs  T(C): 36.5 (03 May 2019 13:48), Max: 37 (02 May 2019 20:38)  T(F): 97.7 (03 May 2019 13:48), Max: 98.6 (02 May 2019 20:38)  HR: 72 (03 May 2019 13:48) (72 - 86)  BP: 160/91 (03 May 2019 13:48) (148/60 - 160/91)  BP(mean): --  RR: 18 (03 May 2019 13:48) (18 - 18)  SpO2: 100% (03 May 2019 13:48) (100% - 100%)    PHYSICAL EXAM:  Constitutional:NAD  Eyes:WILLI, EOMI  Ear/Nose/Throat: no thrush, mucositis.  Moist mucous membranes	  Neck:no JVD, no lymphadenopathy, supple  Respiratory: CTA libby  Cardiovascular: S1S2 RRR, no murmurs  Gastrointestinal:soft, nontender,  nondistended (+) BS  Extremities:no e/e/c  Skin:  no rashes, open wounds or ulcerations        LABS:                        13.7   8.29  )-----------( 214      ( 03 May 2019 06:43 )             41.4     05-03    133<L>  |  96<L>  |  11  ----------------------------<  221<H>  4.3   |  23  |  0.83    Ca    8.7      03 May 2019 06:43  Phos  2.8     05-03  Mg     1.7     05-03    TPro  7.7  /  Alb  3.9  /  TBili  0.5  /  DBili  x   /  AST  23  /  ALT  15  /  AlkPhos  45  05-03          Procalcitonin, Serum: 0.05 (05-01 @ 05:30)                    MICROBIOLOGY:    Culture - Urine (04.29.19 @ 19:32)    Culture - Urine:   NO GROWTH AT 24 HOURS    Specimen Source: URINE MIDSTREAM    Culture - Blood (04.29.19 @ 19:28)    Culture - Blood:   NO ORGANISMS ISOLATED  NO ORGANISMS ISOLATED AT 72 HRS.    Specimen Source: BLOOD VENOUS    Culture - Blood (04.29.19 @ 19:28)    Culture - Blood:   NO ORGANISMS ISOLATED  NO ORGANISMS ISOLATED AT 72 HRS.    Specimen Source: BLOOD PERIPHERAL          RADIOLOGY & ADDITIONAL STUDIES:    < from: CT Chest No Cont (05.02.19 @ 12:15) >    EXAM:  CT CHEST        PROCEDURE DATE:  May  2 2019         INTERPRETATION:  Reason for Exam:  Fever and cough.    CT of the chest was performed from the thoracic inlet to the level of the   adrenal glands without contrast injection.    Comparison:Abdominal CT scan dated April 29, 2019    Tubes/Lines:     None.    Mediastinum/Vessels/Heart:     Aorta and pulmonary arteries are normal in   size. There is no pericardial effusion. No lymphadenopathy. Thyroid gland   is unremarkable    Lungs/Pleura/Airways: No consolidations are noted. Bilateral peripheral   reticular opacities are noted with minimal traction bronchiectasis.   Findings consistent with fibrosis without significant change from   previous exam.    Visualized abdomen:     Unremarkable noncontrast appearance of the upper   abdomen    Bones and soft tissues:     No suspicious osseous lesions. Degenerative   changes noted throughout the spine.    IMPRESSION:    No consolidations or edema.    Bibasilar fibrosis without significantchange from previous study.    < end of copied text >

## 2019-05-04 LAB
BACTERIA BLD CULT: SIGNIFICANT CHANGE UP
BACTERIA BLD CULT: SIGNIFICANT CHANGE UP

## 2019-10-02 PROBLEM — Z60.2 PERSON LIVING ALONE: Status: ACTIVE | Noted: 2018-10-22
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
## 2021-05-17 NOTE — ED ADULT NURSE NOTE - PRO INTERPRETER NEED 2
Detail Level: Detailed Render Risk Assessment In Note?: no Additional Notes: Patient here for fu, she only had a slight headache that went away and no other side effects like she had with the 20 units. Photos taken and patient is pleased. English

## 2022-02-09 NOTE — ED PROVIDER NOTE - MEDICAL DECISION MAKING DETAILS
Available without authorizaiton 8 for 25 days Pt is an 86 y/o F nonsmoker PMHx DM, HTN, HLD, s/p tam, s/p umbilical hernia repair p/w abdominal pain and diarrhea x 7 days -- cbc, cmp, ua, urine culture, CT abdomen and pelvis, stool culture, IV fluids

## 2022-07-23 NOTE — SOCIAL WORK INITIAL EVALUATION ADULT - PLAN
[employer] dose 1 and 2 WILMAN, Darell Romero, met with 86 year old [place]ron[place] female and her nurses aide at bedside.    Patient is alert and oriented x3.    Provided emotional support for ED visit and discussed access to community resources.    Patient lives in a 7th floor apartment alone and does not have to climb any steps.    Patient has a nurses aide provided through [employer] from [employer] ([phone]) agency who comes 7 days a week for 6 hours a day.     Patient uses a cane and walker to assist her with ambulation.    Patient's son, Ronnie Hamilton ([phone]), is her HCP.    Patient declined the need for additional services at this time.    Patient is managing well at home.     Written by: WILMAN Nieto WILMAN, Darell Romero, met with 86 year old [place] female and her nurses aide at bedside.    Patient is alert and oriented x3.    Provided emotional support for ED visit and discussed access to community resources.    Patient lives in a 7th floor apartment alone and does not have to climb any steps.    Patient has a nurses aide provided through [employer] from [employer] ([phone]) agency who comes 7 days a week for 6 hours a day.     Patient uses a cane and walker to assist her with ambulation.    Patient's son, Ronnie Hamilton ([phone]), is her HCP.    Patient declined the need for additional services at this time.    Patient is managing well at home with current services.     Written by: WILMAN Nieto  Reviewed by Mary Ann Eckert LMSW

## 2023-05-23 NOTE — PATIENT PROFILE ADULT. - CURRENT SWALLOWING
14.1   10.49 )-----------( 236      ( 30 May 2023 12:51 )             41.6       05-30    139  |  104  |  17  ----------------------------<  96  3.7   |  23  |  1.01    Ca    9.2      30 May 2023 12:51  Mg     2.0     05-30    TPro  7.3  /  Alb  4.2  /  TBili  0.8  /  DBili  x   /  AST  24  /  ALT  25  /  AlkPhos  109  05-30      PT/INR - ( 30 May 2023 12:51 )   PT: 12.2 sec;   INR: 1.02          PTT - ( 30 May 2023 12:51 )  PTT:27.2 sec    CARDIAC MARKERS ( 30 May 2023 12:51 )  x     / x     / 148 U/L / x     / 2.8 ng/mL
(0) swallows foods and liquids w/o difficulty

## 2023-11-21 NOTE — PHYSICAL THERAPY INITIAL EVALUATION ADULT - TRANSFER SKILLS, REHAB EVAL
Detail Level: Detailed Add 52587 Cpt? (Important Note: In 2017 The Use Of 74560 Is Being Tracked By Cms To Determine Future Global Period Reimbursement For Global Periods): yes Wound Evaluated By: Sigrid Olivares independent

## 2024-02-23 NOTE — PATIENT PROFILE ADULT. - MEDICATION ADMINISTRATION INFO, PROFILE
Problem: Safety - Adult  Goal: Free from fall injury  2/23/2024 0847 by Mckayla Gregory RN  Outcome: Progressing  2/23/2024 0021 by Maira Pérez RN  Outcome: Progressing     Problem: Discharge Planning  Goal: Discharge to home or other facility with appropriate resources  2/23/2024 0847 by Mckayla Gregory RN  Outcome: Progressing  Flowsheets (Taken 2/23/2024 0818)  Discharge to home or other facility with appropriate resources:   Identify barriers to discharge with patient and caregiver   Arrange for needed discharge resources and transportation as appropriate   Identify discharge learning needs (meds, wound care, etc)   Refer to discharge planning if patient needs post-hospital services based on physician order or complex needs related to functional status, cognitive ability or social support system  2/23/2024 0021 by Maira Pérez RN  Outcome: Progressing  Flowsheets (Taken 2/22/2024 2101)  Discharge to home or other facility with appropriate resources: Identify barriers to discharge with patient and caregiver     Problem: Pain  Goal: Verbalizes/displays adequate comfort level or baseline comfort level  2/23/2024 0847 by Mckayla Gregory RN  Outcome: Progressing  Flowsheets (Taken 2/23/2024 0818)  Verbalizes/displays adequate comfort level or baseline comfort level:   Encourage patient to monitor pain and request assistance   Assess pain using appropriate pain scale   Administer analgesics based on type and severity of pain and evaluate response   Implement non-pharmacological measures as appropriate and evaluate response   Notify Licensed Independent Practitioner if interventions unsuccessful or patient reports new pain  2/23/2024 0021 by Maira Pérez RN  Outcome: Progressing     Problem: Nutrition Deficit:  Goal: Optimize nutritional status  2/23/2024 0847 by Mckayla Gregory RN  Outcome: Progressing  2/23/2024 0021 by Maira Pérez RN  Outcome: Progressing     Problem: Chronic Conditions  and Co-morbidities  Goal: Patient's chronic conditions and co-morbidity symptoms are monitored and maintained or improved  2/23/2024 0847 by Mckayla Gregory, RN  Outcome: Progressing  Flowsheets (Taken 2/23/2024 0818)  Care Plan - Patient's Chronic Conditions and Co-Morbidity Symptoms are Monitored and Maintained or Improved:   Monitor and assess patient's chronic conditions and comorbid symptoms for stability, deterioration, or improvement   Collaborate with multidisciplinary team to address chronic and comorbid conditions and prevent exacerbation or deterioration   Update acute care plan with appropriate goals if chronic or comorbid symptoms are exacerbated and prevent overall improvement and discharge  2/23/2024 0021 by Maira Pérez, RN  Outcome: Progressing  Flowsheets (Taken 2/22/2024 2101)  Care Plan - Patient's Chronic Conditions and Co-Morbidity Symptoms are Monitored and Maintained or Improved: Collaborate with multidisciplinary team to address chronic and comorbid conditions and prevent exacerbation or deterioration     Problem: Skin/Tissue Integrity  Goal: Absence of new skin breakdown  Description: 1.  Monitor for areas of redness and/or skin breakdown  2.  Assess vascular access sites hourly  3.  Every 4-6 hours minimum:  Change oxygen saturation probe site  4.  Every 4-6 hours:  If on nasal continuous positive airway pressure, respiratory therapy assess nares and determine need for appliance change or resting period.  Outcome: Progressing      no concerns

## 2024-02-27 NOTE — PATIENT PROFILE ADULT - FUNCTIONAL SCREEN CURRENT LEVEL: EATING, MLM
Occupational Therapy    Visit Type: treatment  Co-treat with: Physical therapist    Relevant History/Co-morbidities: 02/09: admit to OSH w/ syncope and NSTEMI (found down at home by spouse).   02/16: admit to [facility]. s/p PCI and 2x LAD stents, IABP insertion  02/20: VT arrest, CPR performed 2 min  02/21: Impella implanted, IABP removed. VT post op required 4 shocks  LVAD w/u    SUBJECTIVE  Patient agreed to participate in therapy this date.  \"Exactly. I'm not thrilled, but I'll do it if necessary.\" (LVAD)  Patient / Family Goal: maximize function    Pain   Patient denies pain.    OBJECTIVE     Cognitive Status   Level of Consciousness   - alert  Affect/Behavior    - flat, calm and cooperative  Orientation    - Oriented to: person, place, time and situation  Functional Communication   - Forms of Communication: verbal  Attention Span    - Attention: - attends with cues to redirect  Following Direction   - follows one step commands with increased time and follows one step commands with repetition  Transition Between Tasks   - transitions with cues  Initially flat and lethargic. Increased alertness and verbalizations/engagement w/ therapists w/ activity.     Vitals:  Resting:     HR: 62 bpm     BP/MAP: 102/75 (82     SPO2: 100% (6L/min)     Post activity     HR: 71bpm     BP/MAP: 120/62 (74) mmHg     SPO2: 100 % (6L/min)    Patient with impella 5.5 in R axilla.  Line and dressing clean, dry and intact prior to and after therapy session.  Impella flow 3.4. P-6      Range of Motion (ROM)   (degrees unless noted; active unless noted; norms in ( ); negative=lacking to 0, positive=beyond 0)  WNL: LUE, RUE, except as noted  WFL: right shoulder  Comments: Continued education on R axillary Impella precautions (no shoulder ROM >30/40deg).    Strength  (out of 5 unless noted, standard test position unless noted)   : (lbs)    - Neutral:        • Left: Trial(s): 46, 45, 40, Average: 43.67        • Right: Trial(s): 27, 31, 30,  Average: 29.33     norms: 60-64 years, male: left 56.5-97.1, right 69.3-110.1; female: left 35.6-55.8, right 45-65.2    Standing Balance  (ANDRADE = base of support)  Firm Surface: Double Leg      - Static, Eyes Open       - Trial 1 (sec): 30       - Trial 1 details: moderate assist and with double UE support (w RW)       - Trial 2 (sec): 60       - Trial 2 details: moderate assist and with double UE support    Sensation/Dermatome Testing:    Intact: LUE light touch, RUE light touch    Transfers  Assistive devices: 2-wheeled walker  - Sit to stand: maximal assist, with tactile cues, with verbal cues  - Stand to sit: maximal assist, with verbal cues, with tactile cues    STS:  - 3 reps        Functional Ambulation  - Assistance: moderate assist  - Assistive device: 2-wheeled walker  - Distance (ft):6  Activities of Daily Living (ADLs)  Toileting:   - Toilet transfer:        - Assist: maximal assist (simulated, bed pan placed in chair)       - Device: 2-wheeled walker  - Assist: total assist - non-dependent  - Assist needed for: perineal hygiene and steadying    Vision    Has glasses but preferred not to wear then during LVAD education. Functional vision w/o aids.    Interventions    Training provided: ADL training, activity tolerance, compensatory techniques, safety training, transfer training, breathing/relaxation and functional ambulation  OT LVAD w/u completed. Education was performed on components of HM III (including power controller, batteries, clips, power cord), basic safety w/ LVAD (I.e. always connected to power,do not double disconnect from power, 15 min backup battery on power controller) as well as sequence of power source change, and Go Gear options. Following functional demo and education, pt able to perform LVAD power source change w/ Min A and Min cues for effectiveness and battery insertion/removal of clip at Sup level w/ minimal cues for accuracy. Pt w/ flat affect but was receptive to education;  stated felt overwhelmed w/ LVAD but would be able to manage it. Pt demonstrated functional cognition, vision,  strength and FMC to perform tasks.     Skilled input: verbal instruction/cues and facilitation  Verbal Consent: Writer verbally educated and received verbal consent for hand placement, positioning of patient, and techniques to be performed today from patient for clothing adjustments for techniques, therapist position for techniques and hand placement and palpation for techniques as described above and how they are pertinent to the patient's plan of care.         Education:   - Present and ready to learn: patient  Education provided during session:  - Results of above outlined education: Needs reinforcement and Verbalizes understanding    ASSESSMENT   Progress: progressing toward goals  Interferring components: decreased activity tolerance    Discharge needs based on today's assessment:  - Current level of function: significantly below baseline level of function  Ot therapy dc needs: TBD pending progress, Impella.  - Activities of daily living (ADLs) requiring support at discharge: transfers, bathing, toileting, dressing, grooming, ambulation and bed mobility  - Impairments that require further therapy intervention: pain, strength, activity tolerance and balance (cardiopulmonary endurance)  AM-PAC  - Prior Level of Function: IND/MOD I ([facility] 22-24)       Key: MOD A=moderate assistance, IND/MOD I=independent/modified independent  - Generalized Current Level of Function     - Current Self-Cares: 10       Scoring Key= >21 Modified Independent; 20-21 Supervision; 18-19 Minimal assist; 13-17 Moderate assist; 9-12 Max assist; <9 Total assist      PLAN (while hospitalized)  Suggestions for next session as indicated:     OT Frequency: 3-5 x per week      PT/OT ADL Equipment for Discharge: TBD  Agreement to plan and goals: patient agrees with goals and treatment plan    GOALS  Review Date: 2/27/2024  Long Term  Goals: (to be met by time of discharge from hospital)  Grooming: Patient will complete grooming tasks in sitting supervision.  Status: progressing/ongoing  Lower body dressing: Patient will complete lower body dressing minimal assist.  Status: progressing/ongoing  Toileting: Patient will complete toileting minimal assist.  Status: progressing/ongoing  Toilet transfer: Patient will complete toilet transfer with minimal assist.   Status: progressing/ongoing    Documented in the chart in the following areas: Prior Level of Function. Assessment/Plan.    Patient at End of Session:   Location: in chair  Safety measures: alarm system in place/re-engaged, call light within reach and lines intact  Handoff to: nurse and therapist    Therapy procedure time and total treatment time can be found documented on the Time Entry flowsheet   0 = independent

## 2024-10-18 NOTE — PATIENT PROFILE ADULT. - TEACHING/LEARNING RELIGIOUS CONSIDERATIONS
[Follow-Up] : a follow-up visit [Sleep Apnea] : sleep apnea [TextEntry] : Pt here for medical clearance, got a CPAP from Dr. Will, has been using it. Cut back from 9 cigarettes to 4 a day. States no pulmonary complaints currently.  none

## 2025-02-26 NOTE — STROKE CODE NOTE - NIH STROKE SCALE: 9. BEST LANGUAGE, QM
(1) Mild-to-moderate aphasia; some obvious loss of fluency or facility of comprehension, without significant limitation on ideas expressed or form of expression. Reduction of speech and/or comprehension, however, makes conversation about provided material difficult or impossible. For example, in conversation about provided materials, examiner can identify picture or naming card content from patient's response.
Yes